# Patient Record
Sex: FEMALE | Race: WHITE | Employment: FULL TIME | URBAN - METROPOLITAN AREA
[De-identification: names, ages, dates, MRNs, and addresses within clinical notes are randomized per-mention and may not be internally consistent; named-entity substitution may affect disease eponyms.]

---

## 2017-12-02 ENCOUNTER — APPOINTMENT (OUTPATIENT)
Dept: GENERAL RADIOLOGY | Facility: HOSPITAL | Age: 57
End: 2017-12-02
Attending: EMERGENCY MEDICINE
Payer: COMMERCIAL

## 2017-12-02 ENCOUNTER — HOSPITAL ENCOUNTER (EMERGENCY)
Facility: HOSPITAL | Age: 57
Discharge: HOME | End: 2017-12-02
Attending: EMERGENCY MEDICINE
Payer: COMMERCIAL

## 2017-12-02 VITALS
TEMPERATURE: 100 F | HEART RATE: 99 BPM | SYSTOLIC BLOOD PRESSURE: 160 MMHG | HEIGHT: 64 IN | RESPIRATION RATE: 19 BRPM | DIASTOLIC BLOOD PRESSURE: 93 MMHG | WEIGHT: 158 LBS | BODY MASS INDEX: 26.98 KG/M2 | OXYGEN SATURATION: 100 %

## 2017-12-02 DIAGNOSIS — S82.131A CLOSED FRACTURE OF MEDIAL PORTION OF RIGHT TIBIAL PLATEAU, INITIAL ENCOUNTER: Primary | ICD-10-CM

## 2017-12-02 PROCEDURE — 99284 EMERGENCY DEPT VISIT MOD MDM: CPT

## 2017-12-02 PROCEDURE — 73562 X-RAY EXAM OF KNEE 3: CPT | Performed by: EMERGENCY MEDICINE

## 2017-12-02 PROCEDURE — 96372 THER/PROPH/DIAG INJ SC/IM: CPT

## 2017-12-02 PROCEDURE — 73552 X-RAY EXAM OF FEMUR 2/>: CPT | Performed by: EMERGENCY MEDICINE

## 2017-12-02 RX ORDER — HYDROCODONE BITARTRATE AND ACETAMINOPHEN 5; 325 MG/1; MG/1
1-2 TABLET ORAL EVERY 6 HOURS PRN
Qty: 24 TABLET | Refills: 0 | Status: SHIPPED | OUTPATIENT
Start: 2017-12-02 | End: 2018-10-03 | Stop reason: ALTCHOICE

## 2017-12-02 RX ORDER — KETOROLAC TROMETHAMINE 30 MG/ML
60 INJECTION, SOLUTION INTRAMUSCULAR; INTRAVENOUS ONCE
Status: COMPLETED | OUTPATIENT
Start: 2017-12-02 | End: 2017-12-02

## 2017-12-02 NOTE — ED PROVIDER NOTES
Patient Seen in: [facility] Emergency Department    History   Patient presents with:  Lower Extremity Injury (musculoskeletal)    Stated Complaint: right knee pain s/p mechanical fall    HPI    Patient is a 78-year-old female who presents emergency Well-developed, well-nourished female sitting up breathing easily in no apparent distress. Patient is nontoxic in appearance. HEENT: Head is normocephalic, atraumatic. Pupils are 3 mm equally round and reactive to light. Oropharynx is clear.  Mucous membr PULSES IN THE RIGHT FOOT, AND NO PAIN WITH PROM OF THE RIGHT ANKLE.   PT IS SITTING UP IN A CHAIR AND APPEARS MORE COMFORTABLE AT THIS TIME.  PT'S CASE DISCUSSED WITH DR Brielle Lewis WHO FEELS THAT THE PATIENT CAN GO HOME AND WILL FOLLOW UP WITH PT IN THE OFFICE been instructed to return to the ER immediately if symptoms worsen or if any other problems arise. Patient was discharged home with no further new complaints. Disposition and Plan     Clinical Impression:  No diagnosis found.     Disposition:

## 2017-12-13 ENCOUNTER — MED REC SCAN ONLY (OUTPATIENT)
Dept: FAMILY MEDICINE CLINIC | Facility: CLINIC | Age: 57
End: 2017-12-13

## 2018-09-19 DIAGNOSIS — Z12.31 ENCOUNTER FOR SCREENING MAMMOGRAM FOR MALIGNANT NEOPLASM OF BREAST: Primary | ICD-10-CM

## 2018-10-03 ENCOUNTER — HOSPITAL ENCOUNTER (OUTPATIENT)
Dept: GENERAL RADIOLOGY | Age: 58
Discharge: HOME | End: 2018-10-03
Attending: PHYSICIAN ASSISTANT
Payer: COMMERCIAL

## 2018-10-03 ENCOUNTER — LAB ENCOUNTER (OUTPATIENT)
Dept: LAB | Age: 58
End: 2018-10-03
Attending: PHYSICIAN ASSISTANT
Payer: COMMERCIAL

## 2018-10-03 ENCOUNTER — OFFICE VISIT (OUTPATIENT)
Dept: FAMILY MEDICINE CLINIC | Facility: CLINIC | Age: 58
End: 2018-10-03
Payer: COMMERCIAL

## 2018-10-03 VITALS
DIASTOLIC BLOOD PRESSURE: 80 MMHG | SYSTOLIC BLOOD PRESSURE: 146 MMHG | WEIGHT: 164 LBS | BODY MASS INDEX: 28 KG/M2 | TEMPERATURE: 97 F | HEART RATE: 72 BPM | HEIGHT: 64 IN | RESPIRATION RATE: 16 BRPM

## 2018-10-03 DIAGNOSIS — Z23 NEED FOR VACCINATION: ICD-10-CM

## 2018-10-03 DIAGNOSIS — M25.561 ACUTE PAIN OF RIGHT KNEE: Primary | ICD-10-CM

## 2018-10-03 DIAGNOSIS — M25.561 ACUTE PAIN OF RIGHT KNEE: ICD-10-CM

## 2018-10-03 DIAGNOSIS — M71.21 BAKER'S CYST OF KNEE, RIGHT: ICD-10-CM

## 2018-10-03 DIAGNOSIS — R22.0 MASS OF SCALP: ICD-10-CM

## 2018-10-03 DIAGNOSIS — Z87.81 HISTORY OF TIBIAL FRACTURE: ICD-10-CM

## 2018-10-03 DIAGNOSIS — Z00.00 ROUTINE MEDICAL EXAM: ICD-10-CM

## 2018-10-03 DIAGNOSIS — R03.0 ELEVATED BLOOD PRESSURE READING: ICD-10-CM

## 2018-10-03 DIAGNOSIS — Z12.11 SCREENING FOR COLON CANCER: ICD-10-CM

## 2018-10-03 PROCEDURE — 99203 OFFICE O/P NEW LOW 30 MIN: CPT | Performed by: PHYSICIAN ASSISTANT

## 2018-10-03 PROCEDURE — 90471 IMMUNIZATION ADMIN: CPT | Performed by: PHYSICIAN ASSISTANT

## 2018-10-03 PROCEDURE — 82306 VITAMIN D 25 HYDROXY: CPT | Performed by: PHYSICIAN ASSISTANT

## 2018-10-03 PROCEDURE — 83735 ASSAY OF MAGNESIUM: CPT | Performed by: PHYSICIAN ASSISTANT

## 2018-10-03 PROCEDURE — 90686 IIV4 VACC NO PRSV 0.5 ML IM: CPT | Performed by: PHYSICIAN ASSISTANT

## 2018-10-03 PROCEDURE — 84439 ASSAY OF FREE THYROXINE: CPT | Performed by: PHYSICIAN ASSISTANT

## 2018-10-03 PROCEDURE — 36415 COLL VENOUS BLD VENIPUNCTURE: CPT | Performed by: PHYSICIAN ASSISTANT

## 2018-10-03 PROCEDURE — 80061 LIPID PANEL: CPT | Performed by: PHYSICIAN ASSISTANT

## 2018-10-03 PROCEDURE — 73560 X-RAY EXAM OF KNEE 1 OR 2: CPT | Performed by: PHYSICIAN ASSISTANT

## 2018-10-03 PROCEDURE — 80050 GENERAL HEALTH PANEL: CPT | Performed by: PHYSICIAN ASSISTANT

## 2018-10-03 PROCEDURE — 82607 VITAMIN B-12: CPT | Performed by: PHYSICIAN ASSISTANT

## 2018-10-03 NOTE — PROGRESS NOTES
HPI:   Duong Fuentes is a 59year old female who presents for knee pain. Was seen at immediate care in [place] but she declined an xray or any workup. She is a new/former patient to the practice.     10 months ago she fractured her right tibal plateau Comment: 2 times month    Drug use: No       REVIEW OF SYSTEMS:   GENERAL: feels well otherwise  SKIN: lump on scalp for 5 years  HEENT:no vision or hearing changes; denies nasal congestion, sinus pain or sore throat  LUNGS: denies shortness of breath, kapil VIEWS), RIGHT (CPT=73560); Future  - ORTHOPEDIC - INTERNAL    4. Baker's cyst of knee, right  Xray today  Ortho consult  - XR KNEE (1 OR 2 VIEWS), RIGHT (CPT=73560); Future  - ORTHOPEDIC - INTERNAL    5.  Routine medical exam  Fasting labs ordered  Schedule

## 2018-10-08 ENCOUNTER — PATIENT MESSAGE (OUTPATIENT)
Dept: FAMILY MEDICINE CLINIC | Facility: CLINIC | Age: 58
End: 2018-10-08

## 2018-10-08 NOTE — TELEPHONE ENCOUNTER
Patient does not have to see Dr Lacy Bernal. She can see another provider in the practice, Dr Corazon Colvin, Dr Erlinda Rider. She could also try Dr Case Johns with [facility].

## 2018-10-08 NOTE — TELEPHONE ENCOUNTER
From: Catarina Hernandez  To: Dana Leal  Sent: 10/8/2018 10:35 AM CDT  Subject: Referral Request    Tal Pérez,    Thanks for seeing me on October 3rd. I was hoping you could give me a 1 or 2 new referrals.  My knee is still painful and swolle

## 2018-10-26 NOTE — PROGRESS NOTES
HPI:   Magdi Mendoza is a 59year old female who presents for a complete physical exam. Symptoms: denies discharge, itching, burning or dysuria, is menopausal. Patient complains of needing physical.    She had labs done earlier this month.  TSH was Triglycerides (mg/dL)   Date Value   01/26/2016 116     AST (U/L)   Date Value   10/03/2018 19   01/26/2016 28   06/30/2012 13 (L)     ALT (U/L)   Date Value   01/26/2016 74 (H)   06/30/2012 29     Alt (U/L)   Date Value   10/03/2018 34         last pa denies hx of anemia  ENDOCRINE: denies thyroid history  ALL/ASTHMA: denies hx of allergy or asthma    EXAM:   /80   Pulse 79   Temp 98.3 °F (36.8 °C) (Oral)   Resp 20   Ht 64\"   Wt 164 lb 12.8 oz   SpO2 99%   BMI 28.29 kg/m²   Body mass index is 28. reading  Low salt, heart healthy diet, regular aerobic exercise  Recommend DASH diet  Nurse visit in 1 month for BP recheck    6. Hyperlipidemia  Decrease starches and carbs in diet  Discussed fenofibrate; pt declines  Fish oil 2000 mg OTC daily    7.  Subc

## 2018-10-29 ENCOUNTER — OFFICE VISIT (OUTPATIENT)
Dept: FAMILY MEDICINE CLINIC | Facility: CLINIC | Age: 58
End: 2018-10-29
Payer: COMMERCIAL

## 2018-10-29 VITALS
WEIGHT: 164.81 LBS | DIASTOLIC BLOOD PRESSURE: 80 MMHG | HEART RATE: 79 BPM | RESPIRATION RATE: 20 BRPM | TEMPERATURE: 98 F | HEIGHT: 64 IN | SYSTOLIC BLOOD PRESSURE: 138 MMHG | OXYGEN SATURATION: 99 % | BODY MASS INDEX: 28.14 KG/M2

## 2018-10-29 DIAGNOSIS — R22.1 LOCALIZED SWELLING, MASS AND LUMP, NECK: ICD-10-CM

## 2018-10-29 DIAGNOSIS — E78.2 MIXED HYPERLIPIDEMIA: ICD-10-CM

## 2018-10-29 DIAGNOSIS — Z78.0 ASYMPTOMATIC MENOPAUSE: ICD-10-CM

## 2018-10-29 DIAGNOSIS — R03.0 ELEVATED BLOOD PRESSURE READING: ICD-10-CM

## 2018-10-29 DIAGNOSIS — L98.9 SKIN LESION OF RIGHT LEG: ICD-10-CM

## 2018-10-29 DIAGNOSIS — Z00.00 WELL WOMAN EXAM (NO GYNECOLOGICAL EXAM): Primary | ICD-10-CM

## 2018-10-29 DIAGNOSIS — E03.8 SUBCLINICAL HYPOTHYROIDISM: ICD-10-CM

## 2018-10-29 PROCEDURE — 99214 OFFICE O/P EST MOD 30 MIN: CPT | Performed by: PHYSICIAN ASSISTANT

## 2018-10-29 PROCEDURE — 99396 PREV VISIT EST AGE 40-64: CPT | Performed by: PHYSICIAN ASSISTANT

## 2018-12-01 ENCOUNTER — HOSPITAL ENCOUNTER (OUTPATIENT)
Dept: MAMMOGRAPHY | Age: 58
Discharge: HOME | End: 2018-12-01
Attending: INTERNAL MEDICINE
Payer: COMMERCIAL

## 2018-12-01 ENCOUNTER — HOSPITAL ENCOUNTER (OUTPATIENT)
Dept: BONE DENSITY | Age: 58
Discharge: HOME | End: 2018-12-01
Attending: PHYSICIAN ASSISTANT
Payer: COMMERCIAL

## 2018-12-01 ENCOUNTER — HOSPITAL ENCOUNTER (OUTPATIENT)
Dept: ULTRASOUND IMAGING | Age: 58
Discharge: HOME | End: 2018-12-01
Attending: PHYSICIAN ASSISTANT
Payer: COMMERCIAL

## 2018-12-01 DIAGNOSIS — Z78.0 ASYMPTOMATIC MENOPAUSE: ICD-10-CM

## 2018-12-01 DIAGNOSIS — Z12.31 ENCOUNTER FOR SCREENING MAMMOGRAM FOR MALIGNANT NEOPLASM OF BREAST: ICD-10-CM

## 2018-12-01 DIAGNOSIS — R22.1 LOCALIZED SWELLING, MASS AND LUMP, NECK: ICD-10-CM

## 2018-12-01 PROCEDURE — 77080 DXA BONE DENSITY AXIAL: CPT | Performed by: PHYSICIAN ASSISTANT

## 2018-12-01 PROCEDURE — 77067 SCR MAMMO BI INCL CAD: CPT | Performed by: INTERNAL MEDICINE

## 2018-12-01 PROCEDURE — 76536 US EXAM OF HEAD AND NECK: CPT | Performed by: PHYSICIAN ASSISTANT

## 2018-12-01 PROCEDURE — 77063 BREAST TOMOSYNTHESIS BI: CPT | Performed by: INTERNAL MEDICINE

## 2018-12-17 ENCOUNTER — HOSPITAL ENCOUNTER (OUTPATIENT)
Dept: ULTRASOUND IMAGING | Facility: HOSPITAL | Age: 58
Discharge: HOME | End: 2018-12-17
Attending: OTOLARYNGOLOGY
Payer: COMMERCIAL

## 2018-12-17 DIAGNOSIS — E04.1 NONTOXIC UNINODULAR GOITER: ICD-10-CM

## 2018-12-17 PROCEDURE — 76942 ECHO GUIDE FOR BIOPSY: CPT | Performed by: OTOLARYNGOLOGY

## 2018-12-17 PROCEDURE — 10022 US FNA THYROID (CPT=10022/76942): CPT | Performed by: OTOLARYNGOLOGY

## 2018-12-17 PROCEDURE — 88173 CYTOPATH EVAL FNA REPORT: CPT | Performed by: OTOLARYNGOLOGY

## 2020-02-17 ENCOUNTER — OFFICE VISIT (OUTPATIENT)
Dept: FAMILY MEDICINE CLINIC | Facility: CLINIC | Age: 60
End: 2020-02-17
Payer: COMMERCIAL

## 2020-02-17 VITALS
SYSTOLIC BLOOD PRESSURE: 132 MMHG | DIASTOLIC BLOOD PRESSURE: 86 MMHG | TEMPERATURE: 98 F | HEART RATE: 87 BPM | HEIGHT: 64 IN | WEIGHT: 168 LBS | RESPIRATION RATE: 16 BRPM | BODY MASS INDEX: 28.68 KG/M2 | OXYGEN SATURATION: 97 %

## 2020-02-17 DIAGNOSIS — R05.9 COUGH: ICD-10-CM

## 2020-02-17 DIAGNOSIS — B02.9 HERPES ZOSTER WITHOUT COMPLICATION: Primary | ICD-10-CM

## 2020-02-17 PROCEDURE — 99214 OFFICE O/P EST MOD 30 MIN: CPT | Performed by: PHYSICIAN ASSISTANT

## 2020-02-17 RX ORDER — VALACYCLOVIR HYDROCHLORIDE 1 G/1
1 TABLET, FILM COATED ORAL 3 TIMES DAILY
Qty: 21 TABLET | Refills: 0 | Status: SHIPPED | OUTPATIENT
Start: 2020-02-17 | End: 2020-02-24

## 2020-02-17 NOTE — PROGRESS NOTES
Osvaldo Cardoza is a 62year old female. Patient presents with:  Shingles      HPI:   Patient presents today c/o rash on her lower back.  About 2 weeks ago started feeling some soreness in the left low back that would radiate to the left lower abdom Pulse 87   Temp 97.7 °F (36.5 °C) (Oral)   Resp 16   Ht 64\"   Wt 168 lb (76.2 kg)   SpO2 97%   BMI 28.84 kg/m²   GENERAL: well developed, well nourished, NAD. HEENT: AT/NC. PERRLA. EACs normal. Bilateral middle ear effusion, right worse than left.  Post

## 2020-09-30 ENCOUNTER — TELEPHONE (OUTPATIENT)
Dept: FAMILY MEDICINE CLINIC | Facility: CLINIC | Age: 60
End: 2020-09-30

## 2020-09-30 NOTE — TELEPHONE ENCOUNTER
Pt scheduled for px 10/19. Originally through [employer]. It was rescheduled. She says she has a dry cough. She has been tested for covid 3 times due to work. Just want to make sure she is ok to come in for a px.   Told her she would get a call tomorrow

## 2020-10-01 NOTE — TELEPHONE ENCOUNTER
Spoke with patient:  Reports this is not a new cough, chronic, more than few months. Reports she works at [employer]- in [place]. No current smoking, history of casual smoking. No weight loss, has gained weight. No snoring at night.

## 2020-10-17 ENCOUNTER — HOSPITAL ENCOUNTER (OUTPATIENT)
Dept: MAMMOGRAPHY | Facility: HOSPITAL | Age: 60
Discharge: HOME | End: 2020-10-17
Attending: OBSTETRICS & GYNECOLOGY
Payer: COMMERCIAL

## 2020-10-17 DIAGNOSIS — Z12.31 ENCOUNTER FOR SCREENING MAMMOGRAM FOR BREAST CANCER: ICD-10-CM

## 2020-10-17 PROCEDURE — 77063 BREAST TOMOSYNTHESIS BI: CPT | Performed by: OBSTETRICS & GYNECOLOGY

## 2020-10-17 PROCEDURE — 77067 SCR MAMMO BI INCL CAD: CPT | Performed by: OBSTETRICS & GYNECOLOGY

## 2020-10-19 ENCOUNTER — OFFICE VISIT (OUTPATIENT)
Dept: FAMILY MEDICINE CLINIC | Facility: CLINIC | Age: 60
End: 2020-10-19
Payer: COMMERCIAL

## 2020-10-19 ENCOUNTER — LAB ENCOUNTER (OUTPATIENT)
Dept: LAB | Age: 60
End: 2020-10-19
Attending: PHYSICIAN ASSISTANT
Payer: COMMERCIAL

## 2020-10-19 VITALS
OXYGEN SATURATION: 98 % | HEART RATE: 85 BPM | DIASTOLIC BLOOD PRESSURE: 86 MMHG | BODY MASS INDEX: 29.96 KG/M2 | WEIGHT: 167 LBS | TEMPERATURE: 97 F | RESPIRATION RATE: 16 BRPM | SYSTOLIC BLOOD PRESSURE: 132 MMHG | HEIGHT: 62.5 IN

## 2020-10-19 DIAGNOSIS — Z23 NEED FOR VACCINATION: ICD-10-CM

## 2020-10-19 DIAGNOSIS — E55.9 VITAMIN D DEFICIENCY: ICD-10-CM

## 2020-10-19 DIAGNOSIS — E04.9 GOITER: ICD-10-CM

## 2020-10-19 DIAGNOSIS — Z12.11 COLON CANCER SCREENING: ICD-10-CM

## 2020-10-19 DIAGNOSIS — R05.3 CHRONIC COUGH: ICD-10-CM

## 2020-10-19 DIAGNOSIS — Z00.00 ROUTINE GENERAL MEDICAL EXAMINATION AT A HEALTH CARE FACILITY: ICD-10-CM

## 2020-10-19 DIAGNOSIS — Z00.00 ROUTINE GENERAL MEDICAL EXAMINATION AT A HEALTH CARE FACILITY: Primary | ICD-10-CM

## 2020-10-19 PROCEDURE — 90686 IIV4 VACC NO PRSV 0.5 ML IM: CPT | Performed by: FAMILY MEDICINE

## 2020-10-19 PROCEDURE — 3075F SYST BP GE 130 - 139MM HG: CPT | Performed by: FAMILY MEDICINE

## 2020-10-19 PROCEDURE — 80061 LIPID PANEL: CPT | Performed by: PHYSICIAN ASSISTANT

## 2020-10-19 PROCEDURE — 80050 GENERAL HEALTH PANEL: CPT | Performed by: PHYSICIAN ASSISTANT

## 2020-10-19 PROCEDURE — 82306 VITAMIN D 25 HYDROXY: CPT | Performed by: PHYSICIAN ASSISTANT

## 2020-10-19 PROCEDURE — 3079F DIAST BP 80-89 MM HG: CPT | Performed by: FAMILY MEDICINE

## 2020-10-19 PROCEDURE — 90471 IMMUNIZATION ADMIN: CPT | Performed by: FAMILY MEDICINE

## 2020-10-19 PROCEDURE — 82607 VITAMIN B-12: CPT | Performed by: PHYSICIAN ASSISTANT

## 2020-10-19 PROCEDURE — 84439 ASSAY OF FREE THYROXINE: CPT | Performed by: PHYSICIAN ASSISTANT

## 2020-10-19 PROCEDURE — 36415 COLL VENOUS BLD VENIPUNCTURE: CPT | Performed by: PHYSICIAN ASSISTANT

## 2020-10-19 PROCEDURE — 3008F BODY MASS INDEX DOCD: CPT | Performed by: FAMILY MEDICINE

## 2020-10-19 PROCEDURE — 99396 PREV VISIT EST AGE 40-64: CPT | Performed by: PHYSICIAN ASSISTANT

## 2020-10-19 RX ORDER — BENZONATATE 100 MG/1
100 CAPSULE ORAL 3 TIMES DAILY PRN
Qty: 30 CAPSULE | Refills: 0 | Status: SHIPPED | OUTPATIENT
Start: 2020-10-19 | End: 2020-11-19

## 2020-10-19 NOTE — PROGRESS NOTES
HPI:    Patient ID: Toñito Fallon is a 62year old female. HPI   Patient presents today requesting physical exam. Overall feeling okay. Has concerns today about chronic cough over the last 2 years.   It is dry and she denies any associated breat dysuria, frequency and hematuria. Musculoskeletal: Negative for myalgias, joint pain and gait problem. Skin: Negative for rash. Neurological: Negative for weakness, light-headedness and headaches. Hematological: Negative for adenopathy.    Psychiatr Health maintenance issues discussed. Encouraged routine vaccines. Advised healthy diet and regular exercise. Annual physicals. - CBC WITH DIFFERENTIAL WITH PLATELET; Future  - COMP METABOLIC PANEL (14); Future  - LIPID PANEL;  Future  - TSH W REFLEX TO F FREE 0.5 ML  US THYROID (CPT=76536)  XR CHEST PA + LAT CHEST (CPT=71046)         OS#[id number]

## 2021-03-27 ENCOUNTER — HOSPITAL ENCOUNTER (OUTPATIENT)
Age: 61
Discharge: HOME | End: 2021-03-27
Payer: COMMERCIAL

## 2021-03-27 ENCOUNTER — APPOINTMENT (OUTPATIENT)
Dept: GENERAL RADIOLOGY | Age: 61
End: 2021-03-27
Attending: NURSE PRACTITIONER
Payer: COMMERCIAL

## 2021-03-27 VITALS
WEIGHT: 159 LBS | BODY MASS INDEX: 28.17 KG/M2 | SYSTOLIC BLOOD PRESSURE: 143 MMHG | OXYGEN SATURATION: 96 % | TEMPERATURE: 100 F | HEIGHT: 63 IN | RESPIRATION RATE: 16 BRPM | HEART RATE: 87 BPM | DIASTOLIC BLOOD PRESSURE: 86 MMHG

## 2021-03-27 DIAGNOSIS — Z20.822 LAB TEST NEGATIVE FOR COVID-19 VIRUS: Primary | ICD-10-CM

## 2021-03-27 DIAGNOSIS — J06.9 UPPER RESPIRATORY TRACT INFECTION, UNSPECIFIED TYPE: ICD-10-CM

## 2021-03-27 LAB — SARS-COV-2 RNA RESP QL NAA+PROBE: NOT DETECTED

## 2021-03-27 PROCEDURE — 71046 X-RAY EXAM CHEST 2 VIEWS: CPT | Performed by: NURSE PRACTITIONER

## 2021-03-27 PROCEDURE — 99213 OFFICE O/P EST LOW 20 MIN: CPT

## 2021-03-27 PROCEDURE — 94664 DEMO&/EVAL PT USE INHALER: CPT

## 2021-03-27 PROCEDURE — 99204 OFFICE O/P NEW MOD 45 MIN: CPT

## 2021-03-27 RX ORDER — ALBUTEROL SULFATE 90 UG/1
2 AEROSOL, METERED RESPIRATORY (INHALATION) EVERY 4 HOURS PRN
Qty: 1 INHALER | Refills: 0 | Status: SHIPPED | OUTPATIENT
Start: 2021-03-27 | End: 2021-04-26

## 2021-03-27 RX ORDER — PREDNISONE 20 MG/1
20 TABLET ORAL DAILY
Qty: 5 TABLET | Refills: 0 | Status: SHIPPED | OUTPATIENT
Start: 2021-03-27 | End: 2021-04-01

## 2021-03-27 NOTE — ED PROVIDER NOTES
Patient Seen in: [facility]      History   Patient presents with:  Cough/URI    Stated Complaint: FEVER, SORE THROAT, CONGESTION, HA, COUGH X 1 WEEK    HPI/Subjective:   HPI  Patient is a 23-year-old female past medical history of cervici except as noted above.     Physical Exam     ED Triage Vitals [03/27/21 1020]   /86   Pulse 87   Resp 16   Temp 99.7 °F (37.6 °C)   Temp src Tympanic   SpO2 96 %   O2 Device None (Room air)       Current:/86   Pulse 87   Temp 99.7 °F (37.6 °C) ( Lymphadenopathy:      Cervical: No cervical adenopathy. Skin:     General: Skin is warm and dry. Capillary Refill: Capillary refill takes less than 2 seconds. Findings: No rash.    Neurological:      Mental Status: She is alert and oriented to injective conjunctiva bilaterally. Denies headaches or foreign body sensation or light sensitivity. Prescription for tobramycin ophthalmic drops sent to patient's pharmacy on record.   I advised patient to follow-up if her symptoms do not resolve within a

## 2021-03-27 NOTE — ED INITIAL ASSESSMENT (HPI)
Since Monday, c/o headache, sinus congestion, sore throat, low grade temp/chills, and productive cough with yellow sputum. Using OTC meds with minimal relief. Covid test on Wednesday at [place] and was negative.  Had 1st covid vaccine on 3/2/21 and schedu

## 2021-03-28 RX ORDER — TOBRAMYCIN 3 MG/ML
2 SOLUTION/ DROPS OPHTHALMIC EVERY 6 HOURS
Qty: 1 BOTTLE | Refills: 0 | Status: SHIPPED | OUTPATIENT
Start: 2021-03-28 | End: 2021-04-04

## 2021-03-28 NOTE — ED NOTES
Patient called back to clinic stating she awoke with a goopy eye. Wondering if she needs to be seen again. D/w Gavino Doshi and she will call patient.

## 2021-03-30 ENCOUNTER — HOSPITAL ENCOUNTER (OUTPATIENT)
Age: 61
Discharge: HOME | End: 2021-03-30
Payer: COMMERCIAL

## 2021-03-30 ENCOUNTER — APPOINTMENT (OUTPATIENT)
Dept: GENERAL RADIOLOGY | Age: 61
End: 2021-03-30
Attending: NURSE PRACTITIONER
Payer: COMMERCIAL

## 2021-03-30 VITALS
WEIGHT: 159 LBS | BODY MASS INDEX: 27.14 KG/M2 | TEMPERATURE: 100 F | DIASTOLIC BLOOD PRESSURE: 97 MMHG | HEART RATE: 78 BPM | OXYGEN SATURATION: 97 % | HEIGHT: 64 IN | RESPIRATION RATE: 18 BRPM | SYSTOLIC BLOOD PRESSURE: 165 MMHG

## 2021-03-30 DIAGNOSIS — J40 BRONCHITIS: Primary | ICD-10-CM

## 2021-03-30 PROCEDURE — 99204 OFFICE O/P NEW MOD 45 MIN: CPT | Performed by: NURSE PRACTITIONER

## 2021-03-30 PROCEDURE — 99213 OFFICE O/P EST LOW 20 MIN: CPT | Performed by: NURSE PRACTITIONER

## 2021-03-30 PROCEDURE — 71046 X-RAY EXAM CHEST 2 VIEWS: CPT | Performed by: NURSE PRACTITIONER

## 2021-03-30 RX ORDER — BENZONATATE 100 MG/1
200 CAPSULE ORAL 3 TIMES DAILY PRN
Qty: 30 CAPSULE | Refills: 0 | Status: SHIPPED | OUTPATIENT
Start: 2021-03-30 | End: 2021-04-29

## 2021-03-30 NOTE — ED INITIAL ASSESSMENT (HPI)
C/o cough for a week, sore throat and both ear pain left worse than right. A lot of post nasal drip. Was seen here 3/27/21 states cough is worse. Currently on Prednisone.

## 2021-03-30 NOTE — ED PROVIDER NOTES
Patient Seen in: [facility]      History   Patient presents with:  Cough  Sore Throat    Stated Complaint: COUGH     HPI/Subjective:   HPI    Patient presents to the urgent care with report of having been seen here on Saturday being diagn injury  Neuro: Denies syncope, loss of balance, weakness  Integumentary: Denies rashes, bruising petechiae                  Positive for stated complaint: COUGH   Other systems are as noted in HPI. Constitutional and vital signs reviewed.       All other s 11:13 AM.  Michael Isidro, XR, CHEST PA   LATERAL, 6/30/2012, 12:36 PM.  TECHNIQUE:  PA and lateral chest radiographs were obtained. PATIENT STATED HISTORY: (As transcribed by Technologist)  Patient complains of a cough for a week.     FINDINGS:  LUNGS:  No f Comprehensive verbal and written discharge and follow-up instructions were provided to help prevent relapse or worsening. I discussed the case with the patient and they had no questions, complaints, or concerns.   Patient states they understand diagnosis,

## 2021-12-22 ENCOUNTER — HOSPITAL ENCOUNTER (OUTPATIENT)
Age: 61
Discharge: HOME | End: 2021-12-22
Payer: COMMERCIAL

## 2021-12-22 VITALS
TEMPERATURE: 98 F | WEIGHT: 159 LBS | DIASTOLIC BLOOD PRESSURE: 79 MMHG | SYSTOLIC BLOOD PRESSURE: 139 MMHG | HEIGHT: 64 IN | RESPIRATION RATE: 20 BRPM | HEART RATE: 52 BPM | BODY MASS INDEX: 27.14 KG/M2 | OXYGEN SATURATION: 96 %

## 2021-12-22 DIAGNOSIS — R05.3 PERSISTENT COUGH: Primary | ICD-10-CM

## 2021-12-22 DIAGNOSIS — J98.01 BRONCHOSPASM: ICD-10-CM

## 2021-12-22 PROCEDURE — 99213 OFFICE O/P EST LOW 20 MIN: CPT

## 2021-12-22 RX ORDER — ALBUTEROL SULFATE 90 UG/1
2 AEROSOL, METERED RESPIRATORY (INHALATION) EVERY 4 HOURS PRN
Qty: 1 EACH | Refills: 0 | Status: SHIPPED | OUTPATIENT
Start: 2021-12-22 | End: 2022-01-21

## 2021-12-22 RX ORDER — CODEINE PHOSPHATE AND GUAIFENESIN 10; 100 MG/5ML; MG/5ML
10 SOLUTION ORAL NIGHTLY PRN
Qty: 200 ML | Refills: 0 | Status: SHIPPED | OUTPATIENT
Start: 2021-12-22

## 2021-12-22 RX ORDER — DEXAMETHASONE 4 MG/1
16 TABLET ORAL ONCE
Status: COMPLETED | OUTPATIENT
Start: 2021-12-22 | End: 2021-12-22

## 2021-12-22 RX ORDER — CETIRIZINE HYDROCHLORIDE 10 MG/1
10 TABLET ORAL DAILY
Qty: 30 TABLET | Refills: 0 | Status: SHIPPED | OUTPATIENT
Start: 2021-12-22 | End: 2022-01-21

## 2021-12-22 RX ORDER — AZITHROMYCIN 250 MG/1
TABLET, FILM COATED ORAL
Qty: 6 TABLET | Refills: 0 | Status: SHIPPED | OUTPATIENT
Start: 2021-12-22 | End: 2021-12-27

## 2021-12-23 NOTE — ED PROVIDER NOTES
Patient Seen in: [facility]      History   Patient presents with:  Cough    Stated Complaint: Cough/Congestion 3 days    Subjective:   HPI    76-year-old female who tested negative for Covid here with complaint of a persistent wheezy coug Normocephalic.       Right Ear: External ear normal.      Left Ear: External ear normal.      Nose: Nose normal.      Mouth/Throat:      Mouth: Mucous membranes are moist.   Eyes:      Conjunctiva/sclera: Conjunctivae normal.      Pupils: Pupils are equal, discharge today. Previous conversations with PCP and charts were reviewed.                                 Disposition and Plan     Clinical Impression:  Persistent cough  (primary encounter diagnosis)  Bronchospasm     Disposition:  Discharge  12/22/2021

## 2021-12-23 NOTE — ED INITIAL ASSESSMENT (HPI)
C/o started Snday with sore throat and ears popping. Monday started with cough and congestion/nasal discharges. Covid test PCR swabbed on Monday with negative result today.

## 2023-01-26 ENCOUNTER — HOSPITAL ENCOUNTER (OUTPATIENT)
Age: 63
Discharge: HOME | End: 2023-01-26
Payer: COMMERCIAL

## 2023-01-26 VITALS
BODY MASS INDEX: 27.31 KG/M2 | HEART RATE: 73 BPM | DIASTOLIC BLOOD PRESSURE: 92 MMHG | RESPIRATION RATE: 16 BRPM | WEIGHT: 160 LBS | HEIGHT: 64 IN | TEMPERATURE: 98 F | SYSTOLIC BLOOD PRESSURE: 162 MMHG | OXYGEN SATURATION: 97 %

## 2023-01-26 DIAGNOSIS — J04.0 ACUTE LARYNGITIS: Primary | ICD-10-CM

## 2023-01-26 DIAGNOSIS — J20.8 ACUTE VIRAL BRONCHITIS: ICD-10-CM

## 2023-01-26 DIAGNOSIS — R03.0 ELEVATED BLOOD PRESSURE READING: ICD-10-CM

## 2023-01-26 LAB
S PYO AG THROAT QL: NEGATIVE
SARS-COV-2 RNA RESP QL NAA+PROBE: NOT DETECTED

## 2023-01-26 PROCEDURE — 99213 OFFICE O/P EST LOW 20 MIN: CPT

## 2023-01-26 PROCEDURE — 87880 STREP A ASSAY W/OPTIC: CPT

## 2023-01-26 RX ORDER — PREDNISONE 20 MG/1
40 TABLET ORAL DAILY
Qty: 10 TABLET | Refills: 0 | Status: SHIPPED | OUTPATIENT
Start: 2023-01-26 | End: 2023-01-31

## 2023-01-26 RX ORDER — BENZONATATE 100 MG/1
100 CAPSULE ORAL 3 TIMES DAILY PRN
Qty: 30 CAPSULE | Refills: 0 | Status: SHIPPED | OUTPATIENT
Start: 2023-01-26 | End: 2023-02-25

## 2023-01-26 NOTE — ED INITIAL ASSESSMENT (HPI)
C/o cough, chest congestion, headache and post nasal drip since Friday with sore throat worse at night. Loss of voice on Friday getting better but voice hoarse. No fever. Home kit Covid test negative this morning.

## 2023-01-26 NOTE — DISCHARGE INSTRUCTIONS
Please follow up with your PCP if no improvement within 5-7 days. Go directly to the ER for any acute worsening of symptoms. If you smoke, stop smoking. Push oral fluids to help loosen up chest mucous and move it out of your body. Use a cold mist humidifier. Get enough rest and sleep. Take Guaifenesin (Robitussin), an expectorant to loosen mucous. To suppress a dry, hacking cough, you may take Dextromethorphan (Robitussin DM)  If a bronchodilating inhaler is prescribed, follow instructions given. If antibiotics is prescribed, be sure to take all of the the medication even if you improve before finishing the medication. Seek immediate medical attention if symptoms worsen, if fever higher than 102, if no improvement in a few days. Symptoms usually last 10 days.

## 2023-02-20 ENCOUNTER — OFFICE VISIT (OUTPATIENT)
Dept: FAMILY MEDICINE CLINIC | Facility: CLINIC | Age: 63
End: 2023-02-20
Payer: COMMERCIAL

## 2023-02-20 ENCOUNTER — LAB ENCOUNTER (OUTPATIENT)
Dept: LAB | Age: 63
End: 2023-02-20
Attending: FAMILY MEDICINE
Payer: COMMERCIAL

## 2023-02-20 VITALS
HEART RATE: 78 BPM | DIASTOLIC BLOOD PRESSURE: 76 MMHG | BODY MASS INDEX: 27.31 KG/M2 | SYSTOLIC BLOOD PRESSURE: 128 MMHG | OXYGEN SATURATION: 99 % | HEIGHT: 64 IN | RESPIRATION RATE: 18 BRPM | TEMPERATURE: 98 F | WEIGHT: 160 LBS

## 2023-02-20 DIAGNOSIS — Z00.00 WELLNESS EXAMINATION: Primary | ICD-10-CM

## 2023-02-20 DIAGNOSIS — Z12.11 SCREENING FOR COLORECTAL CANCER: ICD-10-CM

## 2023-02-20 DIAGNOSIS — Z13.29 THYROID DISORDER SCREEN: ICD-10-CM

## 2023-02-20 DIAGNOSIS — E55.9 VITAMIN D DEFICIENCY: ICD-10-CM

## 2023-02-20 DIAGNOSIS — Z23 NEED FOR SHINGLES VACCINE: ICD-10-CM

## 2023-02-20 DIAGNOSIS — Z13.220 LIPID SCREENING: ICD-10-CM

## 2023-02-20 DIAGNOSIS — Z12.12 SCREENING FOR COLORECTAL CANCER: ICD-10-CM

## 2023-02-20 DIAGNOSIS — Z00.00 WELLNESS EXAMINATION: ICD-10-CM

## 2023-02-20 DIAGNOSIS — Z13.0 SCREENING FOR DEFICIENCY ANEMIA: ICD-10-CM

## 2023-02-20 DIAGNOSIS — E04.1 THYROID NODULE: ICD-10-CM

## 2023-02-20 LAB
ALBUMIN SERPL-MCNC: 3.9 G/DL (ref 3.4–5)
ALBUMIN/GLOB SERPL: 1.2 {RATIO} (ref 1–2)
ALP LIVER SERPL-CCNC: 60 U/L
ALT SERPL-CCNC: 38 U/L
ANION GAP SERPL CALC-SCNC: 5 MMOL/L (ref 0–18)
AST SERPL-CCNC: 25 U/L (ref 15–37)
BASOPHILS # BLD AUTO: 0.04 X10(3) UL (ref 0–0.2)
BASOPHILS NFR BLD AUTO: 0.9 %
BILIRUB SERPL-MCNC: 0.7 MG/DL (ref 0.1–2)
BUN BLD-MCNC: 16 MG/DL (ref 7–18)
CALCIUM BLD-MCNC: 8.9 MG/DL (ref 8.5–10.1)
CHLORIDE SERPL-SCNC: 108 MMOL/L (ref 98–112)
CHOLEST SERPL-MCNC: 234 MG/DL (ref ?–200)
CO2 SERPL-SCNC: 25 MMOL/L (ref 21–32)
CREAT BLD-MCNC: 0.75 MG/DL
EOSINOPHIL # BLD AUTO: 0.16 X10(3) UL (ref 0–0.7)
EOSINOPHIL NFR BLD AUTO: 3.4 %
ERYTHROCYTE [DISTWIDTH] IN BLOOD BY AUTOMATED COUNT: 12.6 %
FASTING PATIENT LIPID ANSWER: YES
FASTING STATUS PATIENT QL REPORTED: YES
GFR SERPLBLD BASED ON 1.73 SQ M-ARVRAT: 89 ML/MIN/1.73M2 (ref 60–?)
GLOBULIN PLAS-MCNC: 3.2 G/DL (ref 2.8–4.4)
GLUCOSE BLD-MCNC: 93 MG/DL (ref 70–99)
HCT VFR BLD AUTO: 43.1 %
HDLC SERPL-MCNC: 42 MG/DL (ref 40–59)
HGB BLD-MCNC: 13.9 G/DL
IMM GRANULOCYTES # BLD AUTO: 0.01 X10(3) UL (ref 0–1)
IMM GRANULOCYTES NFR BLD: 0.2 %
LDLC SERPL CALC-MCNC: 150 MG/DL (ref ?–100)
LYMPHOCYTES # BLD AUTO: 1.85 X10(3) UL (ref 1–4)
LYMPHOCYTES NFR BLD AUTO: 39.8 %
MCH RBC QN AUTO: 29.9 PG (ref 26–34)
MCHC RBC AUTO-ENTMCNC: 32.3 G/DL (ref 31–37)
MCV RBC AUTO: 92.7 FL
MONOCYTES # BLD AUTO: 0.34 X10(3) UL (ref 0.1–1)
MONOCYTES NFR BLD AUTO: 7.3 %
NEUTROPHILS # BLD AUTO: 2.25 X10 (3) UL (ref 1.5–7.7)
NEUTROPHILS # BLD AUTO: 2.25 X10(3) UL (ref 1.5–7.7)
NEUTROPHILS NFR BLD AUTO: 48.4 %
NONHDLC SERPL-MCNC: 192 MG/DL (ref ?–130)
OSMOLALITY SERPL CALC.SUM OF ELEC: 287 MOSM/KG (ref 275–295)
PLATELET # BLD AUTO: 286 10(3)UL (ref 150–450)
POTASSIUM SERPL-SCNC: 3.5 MMOL/L (ref 3.5–5.1)
PROT SERPL-MCNC: 7.1 G/DL (ref 6.4–8.2)
RBC # BLD AUTO: 4.65 X10(6)UL
SODIUM SERPL-SCNC: 138 MMOL/L (ref 136–145)
T4 FREE SERPL-MCNC: 1 NG/DL (ref 0.8–1.7)
TRIGL SERPL-MCNC: 229 MG/DL (ref 30–149)
TSI SER-ACNC: 4.62 MIU/ML (ref 0.36–3.74)
VIT D+METAB SERPL-MCNC: 16.2 NG/ML (ref 30–100)
VLDLC SERPL CALC-MCNC: 44 MG/DL (ref 0–30)
WBC # BLD AUTO: 4.7 X10(3) UL (ref 4–11)

## 2023-02-20 PROCEDURE — 82306 VITAMIN D 25 HYDROXY: CPT | Performed by: FAMILY MEDICINE

## 2023-02-20 PROCEDURE — 84439 ASSAY OF FREE THYROXINE: CPT | Performed by: FAMILY MEDICINE

## 2023-02-20 PROCEDURE — 80061 LIPID PANEL: CPT | Performed by: FAMILY MEDICINE

## 2023-02-20 PROCEDURE — 80050 GENERAL HEALTH PANEL: CPT | Performed by: FAMILY MEDICINE

## 2023-09-08 ENCOUNTER — HOSPITAL ENCOUNTER (OUTPATIENT)
Age: 63
Discharge: HOME | End: 2023-09-08
Payer: COMMERCIAL

## 2023-09-08 VITALS
SYSTOLIC BLOOD PRESSURE: 134 MMHG | RESPIRATION RATE: 18 BRPM | DIASTOLIC BLOOD PRESSURE: 84 MMHG | OXYGEN SATURATION: 94 % | WEIGHT: 165 LBS | TEMPERATURE: 98 F | HEART RATE: 94 BPM | BODY MASS INDEX: 28 KG/M2

## 2023-09-08 DIAGNOSIS — J98.01 ACUTE BRONCHOSPASM: ICD-10-CM

## 2023-09-08 DIAGNOSIS — J20.8 ACUTE VIRAL BRONCHITIS: Primary | ICD-10-CM

## 2023-09-08 LAB
S PYO AG THROAT QL IA.RAPID: NEGATIVE
SARS-COV-2 RNA RESP QL NAA+PROBE: NOT DETECTED

## 2023-09-08 PROCEDURE — 99213 OFFICE O/P EST LOW 20 MIN: CPT

## 2023-09-08 PROCEDURE — 87651 STREP A DNA AMP PROBE: CPT | Performed by: PHYSICIAN ASSISTANT

## 2023-09-08 PROCEDURE — 99214 OFFICE O/P EST MOD 30 MIN: CPT

## 2023-09-08 RX ORDER — PREDNISONE 20 MG/1
40 TABLET ORAL DAILY
Qty: 10 TABLET | Refills: 0 | Status: SHIPPED | OUTPATIENT
Start: 2023-09-08 | End: 2023-09-13

## 2023-09-08 RX ORDER — BENZONATATE 100 MG/1
100 CAPSULE ORAL 3 TIMES DAILY PRN
Qty: 30 CAPSULE | Refills: 0 | Status: SHIPPED | OUTPATIENT
Start: 2023-09-08 | End: 2023-10-08

## 2023-09-08 RX ORDER — CODEINE PHOSPHATE AND GUAIFENESIN 10; 100 MG/5ML; MG/5ML
10 SOLUTION ORAL NIGHTLY PRN
Qty: 150 ML | Refills: 0 | Status: SHIPPED | OUTPATIENT
Start: 2023-09-08

## 2023-09-08 NOTE — DISCHARGE INSTRUCTIONS
Please follow up with your PCP if no improvement within 5-7 days. Go directly to the ER for any acute worsening of symptoms. If you smoke, stop smoking. Push oral fluids to help loosen up chest mucous and move it out of your body. Use a cold mist humidifier. Get enough rest and sleep. Take Guaifenesin (Robitussin), an expectorant to loosen mucous. To suppress a dry, hacking cough, you may take Dextromethorphan (Robitussin DM)  If a bronchodilating inhaler is prescribed, follow instructions given. Seek immediate medical attention if symptoms worsen, if fever higher than 102, if no improvement in a few days. Symptoms usually last 10 days.

## 2023-09-08 NOTE — ED INITIAL ASSESSMENT (HPI)
Cough- dry cough started Tuesday. denies fever. C/o coughing spasms, voice hoarse. Pt took cold tablets. Pt did home covid test Tuesday and Wednesday result negative.

## 2024-05-20 ENCOUNTER — HOSPITAL ENCOUNTER (OUTPATIENT)
Age: 64
Discharge: HOME | End: 2024-05-20
Attending: EMERGENCY MEDICINE

## 2024-05-20 VITALS
SYSTOLIC BLOOD PRESSURE: 149 MMHG | BODY MASS INDEX: 28.17 KG/M2 | WEIGHT: 165 LBS | OXYGEN SATURATION: 95 % | RESPIRATION RATE: 20 BRPM | HEIGHT: 64 IN | HEART RATE: 110 BPM | DIASTOLIC BLOOD PRESSURE: 85 MMHG | TEMPERATURE: 99 F

## 2024-05-20 DIAGNOSIS — J40 BRONCHITIS: Primary | ICD-10-CM

## 2024-05-20 LAB
S PYO AG THROAT QL IA.RAPID: NEGATIVE
SARS-COV-2 RNA RESP QL NAA+PROBE: NOT DETECTED

## 2024-05-20 PROCEDURE — 99214 OFFICE O/P EST MOD 30 MIN: CPT

## 2024-05-20 PROCEDURE — 87651 STREP A DNA AMP PROBE: CPT | Performed by: EMERGENCY MEDICINE

## 2024-05-20 PROCEDURE — 99213 OFFICE O/P EST LOW 20 MIN: CPT

## 2024-05-20 RX ORDER — BENZONATATE 100 MG/1
100 CAPSULE ORAL 3 TIMES DAILY PRN
Qty: 30 CAPSULE | Refills: 0 | Status: SHIPPED | OUTPATIENT
Start: 2024-05-20 | End: 2024-06-19

## 2024-05-29 NOTE — ED PROVIDER NOTES
Patient Seen in: [facility]      History     Chief Complaint   Patient presents with    Cough/URI    Sore Throat     Stated Complaint: Sore throat, Cough, Ear ache    Subjective:   HPI    64 yo female with several days of cough, congestion, sore throat and left ear pain. No documented fever.     Objective:   Past Medical History:    Cervicitis and endocervicitis              Past Surgical History:   Procedure Laterality Date    Other surgical history      cervix cryosurgery    Tubal ligation                  Social History     Socioeconomic History    Marital status:    Tobacco Use    Smoking status: Never    Smokeless tobacco: Never   Vaping Use    Vaping status: Never Used   Substance and Sexual Activity    Alcohol use: Yes     Alcohol/week: 0.0 standard drinks of alcohol     Comment: 2 times month    Drug use: No   Other Topics Concern    Caffeine Concern Yes     Comment: coffee daily    Exercise Yes   Social History Narrative    ** Merged History Encounter **                   Review of Systems    Positive for stated complaint: Sore throat, Cough, Ear ache  Other systems are as noted in HPI.  Constitutional and vital signs reviewed.      All other systems reviewed and negative except as noted above.    Physical Exam     ED Triage Vitals [05/20/24 1908]   /85   Pulse 110   Resp 20   Temp 99.1 °F (37.3 °C)   Temp src Temporal   SpO2 95 %   O2 Device None (Room air)       Current Vitals:   No data recorded        Physical Exam  Vitals and nursing note reviewed.   Constitutional:       Appearance: Normal appearance. She is well-developed.   HENT:      Head: Normocephalic and atraumatic.      Right Ear: Tympanic membrane normal.      Left Ear: Tympanic membrane normal.      Nose: Congestion present.      Mouth/Throat:      Mouth: Mucous membranes are moist.      Pharynx: Posterior oropharyngeal erythema present. No pharyngeal swelling or oropharyngeal exudate.      Tonsils: No tonsillar  exudate or tonsillar abscesses.   Cardiovascular:      Rate and Rhythm: Normal rate and regular rhythm.   Pulmonary:      Effort: Pulmonary effort is normal. No respiratory distress.      Breath sounds: Normal breath sounds.   Musculoskeletal:      Cervical back: Neck supple.   Lymphadenopathy:      Cervical: No cervical adenopathy.   Skin:     General: Skin is warm and dry.      Capillary Refill: Capillary refill takes less than 2 seconds.   Neurological:      General: No focal deficit present.      Mental Status: She is alert.      Sensory: No sensory deficit.   Psychiatric:         Mood and Affect: Mood normal.         Behavior: Behavior normal.              ED Course     Labs Reviewed   RAPID STREP A - Normal   RAPID SARS-COV-2 BY PCR - Normal                      MDM                                      Medical Decision Making  Otitis media, pneumonia, bronchitis, strep and COVID all in differential. Covid and strep both negative. Exam findings consistent with viral bronchitis. Benzonatate prescribed for cough.     Disposition and Plan     Clinical Impression:  1. Bronchitis         Disposition:  Discharge  5/20/2024  7:39 pm    Follow-up:  Jose De Leon MD  [address] [place] [address]  [phone]      As needed          Medications Prescribed:  Discharge Medication List as of 5/20/2024  7:44 PM        START taking these medications    Details   benzonatate 100 MG Oral Cap Take 1 capsule (100 mg total) by mouth 3 (three) times daily as needed for cough., Normal, Disp-30 capsule, R-0

## 2024-07-19 ENCOUNTER — HOSPITAL ENCOUNTER (INPATIENT)
Facility: HOSPITAL | Age: 64
LOS: 2 days | Discharge: HOME | End: 2024-07-21
Attending: EMERGENCY MEDICINE | Admitting: HOSPITALIST
Payer: COMMERCIAL

## 2024-07-19 ENCOUNTER — APPOINTMENT (OUTPATIENT)
Dept: CT IMAGING | Facility: HOSPITAL | Age: 64
End: 2024-07-19
Attending: EMERGENCY MEDICINE
Payer: COMMERCIAL

## 2024-07-19 ENCOUNTER — OFFICE VISIT (OUTPATIENT)
Dept: FAMILY MEDICINE CLINIC | Facility: CLINIC | Age: 64
End: 2024-07-19
Payer: COMMERCIAL

## 2024-07-19 ENCOUNTER — APPOINTMENT (OUTPATIENT)
Dept: MRI IMAGING | Facility: HOSPITAL | Age: 64
End: 2024-07-19
Attending: EMERGENCY MEDICINE
Payer: COMMERCIAL

## 2024-07-19 VITALS
RESPIRATION RATE: 18 BRPM | BODY MASS INDEX: 29.37 KG/M2 | WEIGHT: 172 LBS | SYSTOLIC BLOOD PRESSURE: 178 MMHG | OXYGEN SATURATION: 100 % | HEIGHT: 64 IN | HEART RATE: 69 BPM | DIASTOLIC BLOOD PRESSURE: 96 MMHG

## 2024-07-19 DIAGNOSIS — I10 HYPERTENSION, UNSPECIFIED TYPE: Primary | ICD-10-CM

## 2024-07-19 DIAGNOSIS — I61.0 HEMORRHAGE IN CAUDATE NUCLEUS (HCC): ICD-10-CM

## 2024-07-19 DIAGNOSIS — R42 LIGHTHEADEDNESS: Primary | ICD-10-CM

## 2024-07-19 DIAGNOSIS — R42 DIZZINESS: ICD-10-CM

## 2024-07-19 DIAGNOSIS — R29.818 AURA: ICD-10-CM

## 2024-07-19 DIAGNOSIS — R94.31 ABNORMAL EKG: ICD-10-CM

## 2024-07-19 DIAGNOSIS — I10 PRIMARY HYPERTENSION: ICD-10-CM

## 2024-07-19 LAB
ALBUMIN SERPL-MCNC: 4.6 G/DL (ref 3.2–4.8)
ALBUMIN/GLOB SERPL: 1.7 {RATIO} (ref 1–2)
ALP LIVER SERPL-CCNC: 70 U/L
ALT SERPL-CCNC: 43 U/L
ANION GAP SERPL CALC-SCNC: 5 MMOL/L (ref 0–18)
AST SERPL-CCNC: 26 U/L (ref ?–34)
ATRIAL RATE: 65 BPM
BASOPHILS # BLD AUTO: 0.06 X10(3) UL (ref 0–0.2)
BASOPHILS NFR BLD AUTO: 0.8 %
BILIRUB SERPL-MCNC: 0.7 MG/DL (ref 0.2–1.1)
BUN BLD-MCNC: 16 MG/DL (ref 9–23)
CALCIUM BLD-MCNC: 9.2 MG/DL (ref 8.7–10.4)
CHLORIDE SERPL-SCNC: 108 MMOL/L (ref 98–112)
CHOLEST SERPL-MCNC: 218 MG/DL (ref ?–200)
CO2 SERPL-SCNC: 27 MMOL/L (ref 21–32)
CREAT BLD-MCNC: 0.77 MG/DL
EGFRCR SERPLBLD CKD-EPI 2021: 86 ML/MIN/1.73M2 (ref 60–?)
EOSINOPHIL # BLD AUTO: 0.16 X10(3) UL (ref 0–0.7)
EOSINOPHIL NFR BLD AUTO: 2 %
ERYTHROCYTE [DISTWIDTH] IN BLOOD BY AUTOMATED COUNT: 12.4 %
GLOBULIN PLAS-MCNC: 2.7 G/DL (ref 2.8–4.4)
GLUCOSE BLD-MCNC: 114 MG/DL (ref 70–99)
HCT VFR BLD AUTO: 39.7 %
HDLC SERPL-MCNC: 39 MG/DL (ref 40–59)
HGB BLD-MCNC: 14 G/DL
IMM GRANULOCYTES # BLD AUTO: 0.03 X10(3) UL (ref 0–1)
IMM GRANULOCYTES NFR BLD: 0.4 %
LDLC SERPL CALC-MCNC: 128 MG/DL (ref ?–100)
LYMPHOCYTES # BLD AUTO: 2.35 X10(3) UL (ref 1–4)
LYMPHOCYTES NFR BLD AUTO: 30.1 %
MCH RBC QN AUTO: 31.3 PG (ref 26–34)
MCHC RBC AUTO-ENTMCNC: 35.3 G/DL (ref 31–37)
MCV RBC AUTO: 88.6 FL
MONOCYTES # BLD AUTO: 0.53 X10(3) UL (ref 0.1–1)
MONOCYTES NFR BLD AUTO: 6.8 %
NEUTROPHILS # BLD AUTO: 4.69 X10 (3) UL (ref 1.5–7.7)
NEUTROPHILS # BLD AUTO: 4.69 X10(3) UL (ref 1.5–7.7)
NEUTROPHILS NFR BLD AUTO: 59.9 %
NONHDLC SERPL-MCNC: 179 MG/DL (ref ?–130)
OSMOLALITY SERPL CALC.SUM OF ELEC: 292 MOSM/KG (ref 275–295)
P AXIS: 3 DEGREES
P-R INTERVAL: 142 MS
PLATELET # BLD AUTO: 325 10(3)UL (ref 150–450)
POTASSIUM SERPL-SCNC: 3.5 MMOL/L (ref 3.5–5.1)
PROT SERPL-MCNC: 7.3 G/DL (ref 5.7–8.2)
Q-T INTERVAL: 390 MS
QRS DURATION: 84 MS
QTC CALCULATION (BEZET): 405 MS
R AXIS: -6 DEGREES
RBC # BLD AUTO: 4.48 X10(6)UL
SODIUM SERPL-SCNC: 140 MMOL/L (ref 136–145)
T AXIS: 15 DEGREES
TRIGL SERPL-MCNC: 288 MG/DL (ref 30–149)
TROPONIN I SERPL HS-MCNC: 3 NG/L
VENTRICULAR RATE: 65 BPM
VLDLC SERPL CALC-MCNC: 53 MG/DL (ref 0–30)
WBC # BLD AUTO: 7.8 X10(3) UL (ref 4–11)

## 2024-07-19 PROCEDURE — 3077F SYST BP >= 140 MM HG: CPT | Performed by: FAMILY MEDICINE

## 2024-07-19 PROCEDURE — 70551 MRI BRAIN STEM W/O DYE: CPT | Performed by: EMERGENCY MEDICINE

## 2024-07-19 PROCEDURE — 93000 ELECTROCARDIOGRAM COMPLETE: CPT | Performed by: FAMILY MEDICINE

## 2024-07-19 PROCEDURE — 3080F DIAST BP >= 90 MM HG: CPT | Performed by: FAMILY MEDICINE

## 2024-07-19 PROCEDURE — 70496 CT ANGIOGRAPHY HEAD: CPT | Performed by: EMERGENCY MEDICINE

## 2024-07-19 PROCEDURE — 70498 CT ANGIOGRAPHY NECK: CPT | Performed by: EMERGENCY MEDICINE

## 2024-07-19 PROCEDURE — 99214 OFFICE O/P EST MOD 30 MIN: CPT | Performed by: FAMILY MEDICINE

## 2024-07-19 PROCEDURE — 3008F BODY MASS INDEX DOCD: CPT | Performed by: FAMILY MEDICINE

## 2024-07-19 PROCEDURE — 99222 1ST HOSP IP/OBS MODERATE 55: CPT | Performed by: STUDENT IN AN ORGANIZED HEALTH CARE EDUCATION/TRAINING PROGRAM

## 2024-07-19 RX ORDER — ONDANSETRON 2 MG/ML
4 INJECTION INTRAMUSCULAR; INTRAVENOUS EVERY 6 HOURS PRN
Status: DISCONTINUED | OUTPATIENT
Start: 2024-07-19 | End: 2024-07-21

## 2024-07-19 RX ORDER — HYDRALAZINE HYDROCHLORIDE 20 MG/ML
10 INJECTION INTRAMUSCULAR; INTRAVENOUS EVERY 2 HOUR PRN
Status: DISCONTINUED | OUTPATIENT
Start: 2024-07-19 | End: 2024-07-21

## 2024-07-19 RX ORDER — SENNOSIDES 8.6 MG
17.2 TABLET ORAL NIGHTLY PRN
Status: DISCONTINUED | OUTPATIENT
Start: 2024-07-19 | End: 2024-07-21

## 2024-07-19 RX ORDER — ENEMA 19; 7 G/133ML; G/133ML
1 ENEMA RECTAL ONCE AS NEEDED
Status: DISCONTINUED | OUTPATIENT
Start: 2024-07-19 | End: 2024-07-21

## 2024-07-19 RX ORDER — POLYETHYLENE GLYCOL 3350 17 G/17G
17 POWDER, FOR SOLUTION ORAL DAILY PRN
Status: DISCONTINUED | OUTPATIENT
Start: 2024-07-19 | End: 2024-07-21

## 2024-07-19 RX ORDER — LABETALOL HYDROCHLORIDE 5 MG/ML
10 INJECTION, SOLUTION INTRAVENOUS EVERY 10 MIN PRN
Status: DISCONTINUED | OUTPATIENT
Start: 2024-07-19 | End: 2024-07-21

## 2024-07-19 RX ORDER — METOCLOPRAMIDE HYDROCHLORIDE 5 MG/ML
10 INJECTION INTRAMUSCULAR; INTRAVENOUS EVERY 8 HOURS PRN
Status: DISCONTINUED | OUTPATIENT
Start: 2024-07-19 | End: 2024-07-21

## 2024-07-19 RX ORDER — ACETAMINOPHEN 500 MG
1000 TABLET ORAL EVERY 8 HOURS PRN
Status: DISCONTINUED | OUTPATIENT
Start: 2024-07-19 | End: 2024-07-21

## 2024-07-19 RX ORDER — ALPRAZOLAM 0.25 MG/1
0.25 TABLET ORAL NIGHTLY PRN
Status: DISCONTINUED | OUTPATIENT
Start: 2024-07-19 | End: 2024-07-21

## 2024-07-19 RX ORDER — HYDRALAZINE HYDROCHLORIDE 20 MG/ML
10 INJECTION INTRAMUSCULAR; INTRAVENOUS ONCE
Status: DISCONTINUED | OUTPATIENT
Start: 2024-07-19 | End: 2024-07-19

## 2024-07-19 RX ORDER — DEXTROSE MONOHYDRATE AND SODIUM CHLORIDE 5; .45 G/100ML; G/100ML
INJECTION, SOLUTION INTRAVENOUS CONTINUOUS
OUTPATIENT
Start: 2024-07-19 | End: 2024-07-19

## 2024-07-19 RX ORDER — ATORVASTATIN CALCIUM 40 MG/1
40 TABLET, FILM COATED ORAL NIGHTLY
Status: DISCONTINUED | OUTPATIENT
Start: 2024-07-19 | End: 2024-07-21

## 2024-07-19 RX ORDER — BENZONATATE 100 MG/1
200 CAPSULE ORAL 3 TIMES DAILY PRN
Status: DISCONTINUED | OUTPATIENT
Start: 2024-07-19 | End: 2024-07-21

## 2024-07-19 RX ORDER — HYDRALAZINE HYDROCHLORIDE 20 MG/ML
5 INJECTION INTRAMUSCULAR; INTRAVENOUS EVERY 6 HOURS PRN
Status: DISCONTINUED | OUTPATIENT
Start: 2024-07-19 | End: 2024-07-21

## 2024-07-19 RX ORDER — SODIUM CHLORIDE 9 MG/ML
INJECTION, SOLUTION INTRAVENOUS CONTINUOUS
Status: DISCONTINUED | OUTPATIENT
Start: 2024-07-19 | End: 2024-07-21

## 2024-07-19 RX ORDER — LABETALOL HYDROCHLORIDE 5 MG/ML
20 INJECTION, SOLUTION INTRAVENOUS ONCE
Status: COMPLETED | OUTPATIENT
Start: 2024-07-19 | End: 2024-07-19

## 2024-07-19 RX ORDER — ECHINACEA PURPUREA EXTRACT 125 MG
1 TABLET ORAL
Status: DISCONTINUED | OUTPATIENT
Start: 2024-07-19 | End: 2024-07-21

## 2024-07-19 RX ORDER — BISACODYL 10 MG
10 SUPPOSITORY, RECTAL RECTAL
Status: DISCONTINUED | OUTPATIENT
Start: 2024-07-19 | End: 2024-07-21

## 2024-07-19 RX ORDER — HYDRALAZINE HYDROCHLORIDE 20 MG/ML
10 INJECTION INTRAMUSCULAR; INTRAVENOUS ONCE
Status: COMPLETED | OUTPATIENT
Start: 2024-07-19 | End: 2024-07-19

## 2024-07-19 NOTE — PROGRESS NOTES
HISTORY:  Chief Complaint   Patient presents with    Hypertension     PT reports dizziness and lightheadedness and has been taking at home readings. Readings at home average around 160/100    Pt is planning to go camping in a tent for a week starting tomorrow.   Pt here with daughter   Pt here for high bp lightheadedness dizziness  starting Monday no syncope   On Monday she had epidose of lightheadedness near syncope water glass falling from hand and what they describe an alexander right side   Since then her bp has been 160s /100-105  Says dizziness slowely improving felt shaky today   No cp or sob denies palpitations  Denies significant headache   Pt planning camping trip with family tomorrow in [facility]   The following portions of the patient's history were reviewed and updated as appropriate:  Past Medical History:    Cervicitis and endocervicitis     Patient Active Problem List   Diagnosis    Unspecified hypothyroidism    Hypercholesterolemia     Past Surgical History:   Procedure Laterality Date    Other surgical history      cervix cryosurgery    Tubal ligation       Family History   Problem Relation Age of Onset    High Cholesterol Father     Hypertension Father     Breast Cancer Paternal Grandmother 60        In her 60's.    Hypertension Mother     Thyroid Disorder Mother     High Cholesterol Mother     Other (colitis) Mother     High Cholesterol Sister     Hypertension Sister     High Cholesterol Brother     Hypertension Brother      Social History     Socioeconomic History    Marital status:    Tobacco Use    Smoking status: Never    Smokeless tobacco: Never   Vaping Use    Vaping status: Never Used   Substance and Sexual Activity    Alcohol use: Yes     Alcohol/week: 0.0 standard drinks of alcohol     Comment: 2 times month    Drug use: No   Other Topics Concern    Caffeine Concern Yes     Comment: coffee daily    Exercise Yes       No current outpatient medications on file.       No Known  Allergies      Review of Systems  Const: Denies fever chills  Eyes: Denies drainage no pain with movement of eye  ENT: denies sore throat,no ear pain ,no sinus drainage  CV: Denies chest pain or palpitations  Pulm: Denies shortness of breath  GI: Denies abdominal pain, nausea, vomiting or diarrhea  : Denies dysuria  Musculoskeletal: Denies neck or back pain  Skin: Denies rashes  Neuro: Denies focal weakness or numbness,  + dizziness lightheadedness       Vitals:    07/19/24 1231   BP: (!) 178/96   Pulse: 69   Resp: 18   SpO2: 100%   Weight: 172 lb (78 kg)   Height: 5' 4\" (1.626 m)        Physical Exam  General Appearance:  Alert, oriented, in no acute distress  Eyes no discharge  Neck ;soft supple,no obvious swelling  CNS: no acute focal neurological deficits  Chest Wall:  no chest wall tenderness.  Lungs:  Normal expansion.  Clear to auscultation.   Heart:  Heart sounds are normal.    Abdomen:  Soft, non-tender, normal bowel sounds;  Psych mood and affect appropriate  skin ;no obvious skin lesion in exposed area  Lower Extremities: No gross edema,        Assessment/Plan:    Herlinda was seen today for hypertension.    Diagnoses and all orders for this visit:    Lightheadedness  -     EKG with interpretation and Report -IN OFFICE [01057]  EKG no ac findings  Given his issues with lightheadedness /dizziness  and the aura she had along with glass falling from her hand and htn   I will have her seen in er for more work up   Labs /likely ct   Meds    D/d including tia and effects of bp and risks of strokes and cardiac issues from bp dw them   Dw pt and daughter they will go to er now   Fu with pcp early next week for fu on bp and symptoms  Watch bp closely   Keep hydration today to er   Daughter will take her now  Primary hypertension  -     EKG with interpretation and Report -IN OFFICE [[id number]]    Dizziness  As above   Aura  As above   Abn EKG   Get bp under control  Fu with pcp can get echo near future to look for lvh  with pcp   Patient Active Problem List   Diagnosis    Unspecified hypothyroidism    Hypercholesterolemia       Patient's Body mass index is 29.52 kg/m².    .    Return in about 4 days (around 7/23/2024).    No current [facility]-ordered outpatient medications on file.    Rey Boss MD  7/19/2024

## 2024-07-19 NOTE — ED PROVIDER NOTES
Patient Seen in: [facility] Emergency Department      History     Chief Complaint   Patient presents with    Hypertension     Stated Complaint: 179/96 BP in MD office, c/o headache,    Subjective:   HPI    This is a right-handed 63-year-old female no significant past medical history who presents with hypertension.  Patient reports on Monday afternoon she was not feeling good she felt lightheaded and felt off balance.  She felt like her right arm and the right leg were weak.  Her daughter gave her a glass of water and she dropped it because she could not hang onto it.  She also states she has some wavy lines in her vision in her right eye.  Speech was clear.  She denied any headache.  Her blood pressure was elevated 160/100 at that time.  The symptoms lasted about 20 minutes and went away.  She called her doctor the next day but cannot get in till July 31.  She is been monitoring her blood pressure throughout the week and it has been running over the 160s over 90s.  Today she felt weak and fatigued and was concerned and thought she should come and get checked out because she is due to go on a camping trip.  Currently she denies any symptoms.  No chest pain or shortness of breath.  No abdominal pain.  She states she is never been told she had hypertension she is never been on medications.  Hypertension does run in her family.  She does not smoke tobacco.  She drinks alcohol socially.  She presents here with her family for further evaluation.    Objective:   Past Medical History:    Cervicitis and endocervicitis              Past Surgical History:   Procedure Laterality Date    Other surgical history      cervix cryosurgery    Tubal ligation                  Social History     Socioeconomic History    Marital status:    Tobacco Use    Smoking status: Never    Smokeless tobacco: Never   Vaping Use    Vaping status: Never Used   Substance and Sexual Activity    Alcohol use: Yes     Alcohol/week: 0.0  standard drinks of alcohol     Comment: 2 times month    Drug use: No   Other Topics Concern    Caffeine Concern Yes     Comment: coffee daily    Exercise Yes   Social History Narrative    ** Merged History Encounter **                   Review of Systems    Positive for stated Chief Complaint: Hypertension    Other systems are as noted in HPI.  Constitutional and vital signs reviewed.      All other systems reviewed and negative except as noted above.    Physical Exam     ED Triage Vitals   BP 07/19/24 1628 151/89   Pulse 07/19/24 1628 85   Resp 07/19/24 1628 18   Temp 07/19/24 1628 98.1 °F (36.7 °C)   Temp src 07/19/24 1628 Temporal   SpO2 07/19/24 1628 96 %   O2 Device 07/19/24 1800 None (Room air)       Current Vitals:   Vital Signs  BP: 157/80  Pulse: 66  Resp: 11  Temp: 98.1 °F (36.7 °C)  Temp src: Temporal  MAP (mmHg): (!) 101    Oxygen Therapy  SpO2: 99 %  O2 Device: None (Room air)            Physical Exam    GENERAL: Awake, alert oriented x3, nontoxic appearing.   SKIN: Normal, warm, and dry.  HEENT:  Pupils equally round and reactive to light. Conjuctiva clear.  Oropharynx is clear and moist.   Lungs: Clear to auscultation bilaterally with no rales, no retractions, and no wheezing.  HEART:  Regular rate and rhythm. S1 and S2. No murmurs, no rubs or gallops.   ABDOMEN: Soft, nontender and nondistended. Normoactive bowel sounds. No rebound. No guarding.   EXTREMITIES: Warm with brisk capillary refill.   Neuro: Speech clear. No facial asymmetry. No pronator drift. Motor strength 5 out of 5 in upper and lower extremities. Patient can ambulate with steady gait.         ED Course     Labs Reviewed   COMP METABOLIC PANEL (14) - Abnormal; Notable for the following components:       Result Value    Glucose 114 (*)     Globulin  2.7 (*)     All other components within normal limits   TROPONIN I HIGH SENSITIVITY - Normal   CBC WITH DIFFERENTIAL WITH PLATELET    Narrative:     The following orders were created for  panel order CBC With Differential With Platelet.  Procedure                               Abnormality         Status                     ---------                               -----------         ------                     CBC W/ DIFFERENTIAL[[id number]]                              Final result                 Please view results for these tests on the individual orders.   RAINBOW DRAW LAVENDER   RAINBOW DRAW LIGHT GREEN   CBC W/ DIFFERENTIAL     EKG    Rate, intervals and axes as noted on EKG Report.  Rate: 70  Rhythm: Sinus Rhythm  Reading: No acute changes.               MRI BRAIN (CPT=70551)    Result Date: 7/19/2024  PROCEDURE:  MRI BRAIN (CPT=70551)  COMPARISON:  None.  INDICATIONS:  179/96 BP in MD office, c/o headache,  TECHNIQUE:  MRI of the brain was performed with multi-planar T1, T2-weighted images with FLAIR sequences and diffusion weighted images without infusion.  PATIENT STATED HISTORY: (As transcribed by Technologist)  Patient states having headaches    FINDINGS:  INTRACRANIAL:  Foci of T2/FLAIR hyperintensity with T1 mild hyperintensity noted within the left caudate head measuring 7 x 5 mm consistent with a subacute hemorrhage.  There is mild susceptibility signal on SWI within the lesion suggesting that the hemorrhages a few days old.  There is also an old lacunar infarct within the left centrum semiovale.  Diffusion weighted imaging was performed and is unremarkable.  There is no evidence for acute infarction.  There is no evidence of hemorrhage or mass lesion.  VENTRICLES/SULCI:   Ventricles and sulci are normal in caliber.  There are no extra-axial fluid collections. There is no midline shift. SINUSES/ORBITS:       The visualized paranasal sinuses are clear.  The orbits are unremarkable. MASTOIDS:      The mastoids are clear.            CONCLUSION:   Acute to subacute small hemorrhage involving the left caudate head.  Findings discussed with Dr. Patton at 2018 hours on 7/19/2024 with read  back.  LOCATION:  [place]   Dictated by (CST): Koby Culver MD on 7/19/2024 at 8:11 PM     Finalized by (CST): Koby Culver MD on 7/19/2024 at 8:18 PM                [facility] 0      This is a right-handed 63-year-old female no significant past medical history who presents with hypertension.  Patient reports on Monday afternoon she was not feeling good she felt lightheaded and felt off balance.  She felt like her right arm and the right leg were weak.  Her daughter gave her a glass of water and she dropped it because she could not hang onto it.  She also states she has some wavy lines in her vision in her right eye.  Differential include stroke, hypertensive urgency, intracranial hemorrhage at this is a life threat.      Patient placed on cardiac monitor, continuous pulse oximetry and IV line was established of normal saline.  patient was given IV labetalol for hypertension.  Blood pressure improved to 148/79.  Basic labs were obtained.  CBC: White blood cell count 7.8.  Hemoglobin 14.  Platelet 325.  CMP: BUN 16 paragon 0.7 per glucose 114.  Bicarb 27.  Troponin 3.      Discussed case with neurology Dr. Diaz    Discussed case with neurosurgery Dr. Zuluaga agrees with current management.    MRI brain was obtained demonstrated acute to subacute small hemorrhage involving the left caudate head      Discussed findings with Dr. Diaz.  Will place neurosurgery on consult and admit patient.  Also ordered CT brain/carotids at his request.  CT demonstrated left caudate head lesion which may represent subacute to chronic infarct or hemorrhage.    Patient and family informed of imaging findings.  Informed of need for admission.  Expresses understanding.    Discussed with [facility] hospitalist Dr. Hummel.          Disposition and Plan     Clinical Impression:  1. Hypertension, unspecified type    2. Hemorrhage in caudate nucleus (HCC)         Disposition:  Admit  7/19/2024  9:37 pm    Follow-up:  Jose De Leon  MD  [address] [place] [id number]  [phone]    Follow up on 7/22/2024            Medications Prescribed:  Current Discharge Medication List                            Hospital Problems       Present on Admission  Date Reviewed: 7/19/2024            ICD-10-CM Noted POA    * (Principal) Hypertension, unspecified type I10 7/19/2024 Unknown

## 2024-07-19 NOTE — PATIENT INSTRUCTIONS
Lifestyle Modification:   (AVG. SBP = Average Systolic Blood Pressure)  Lifestyle Modification Recommendations  Modification Recommendation Avg. SBP Reduction Range   Weight Reduction Maintain normal body weight (body mass index 18.5-24.9 kg/m2) 5-20 mmHg/10 kg   DASH eating plan Adopt a diet rich in fruits, vegetables, and low fat dairy products with reduced content of saturated and total fat. 8-14 mmHg   Dietary sodium reduction Reduce dietary sodium intake to <= 100 mmol per day (2.4 g sodium or 6 g sodium chloride) 2-8 mmHg   Aerobic physical activity Regular aerobic physical activity (e.g., brisk walking, light jogging, cycling, swimming, etc.) for a goal of at least 150 minutes per week. 4-9 mmHg   Moderation of alcohol consumption Men: limit to <= 2 drinks* per day.  Women and lighter weight persons: limit to <= 1 drink* per day. 2-4 mmHg      * 1 drink = ½ oz or 15 mL ethanol (e.g. 12 oz beer, 5 oz wine, 1.5 oz 80-proof whiskey.   Effects are dose and time dependent.

## 2024-07-20 LAB
EST. AVERAGE GLUCOSE BLD GHB EST-MCNC: 103 MG/DL (ref 68–126)
GLUCOSE BLD-MCNC: 107 MG/DL (ref 70–99)
HBA1C MFR BLD: 5.2 % (ref ?–5.7)
INR BLD: 1.06 (ref 0.8–1.2)
PROTHROMBIN TIME: 13.9 SECONDS (ref 11.6–14.8)

## 2024-07-20 PROCEDURE — 99223 1ST HOSP IP/OBS HIGH 75: CPT | Performed by: OTHER

## 2024-07-20 PROCEDURE — 99232 SBSQ HOSP IP/OBS MODERATE 35: CPT | Performed by: HOSPITALIST

## 2024-07-20 RX ORDER — AMLODIPINE BESYLATE 5 MG/1
5 TABLET ORAL DAILY
Status: DISCONTINUED | OUTPATIENT
Start: 2024-07-20 | End: 2024-07-21

## 2024-07-20 RX ORDER — AMLODIPINE BESYLATE 5 MG/1
5 TABLET ORAL DAILY
Qty: 90 TABLET | Refills: 1 | Status: SHIPPED | OUTPATIENT
Start: 2024-07-20 | End: 2024-07-21

## 2024-07-20 RX ORDER — ATORVASTATIN CALCIUM 40 MG/1
40 TABLET, FILM COATED ORAL NIGHTLY
Qty: 90 TABLET | Refills: 1 | Status: SHIPPED | OUTPATIENT
Start: 2024-07-20

## 2024-07-20 NOTE — ED QUICK NOTES
Orders for admission, patient is aware of plan and ready to go upstairs. Any questions, please call ED RN Leonila at extension [phone].     Patient Covid vaccination status: Partially vaccinated     COVID Test Ordered in ED: None    COVID Suspicion at Admission: N/A    Running Infusions:  None    Mental Status/LOC at time of transport: A&Ox4    Other pertinent information:   CIWA score: N/A   NIH score:  0         What Type Of Note Output Would You Prefer (Optional)?: Bullet Format

## 2024-07-20 NOTE — OCCUPATIONAL THERAPY NOTE
OCCUPATIONAL THERAPY EVALUATION - INPATIENT    Room Number: 7615/7615-A  Evaluation Date: 7/20/2024     Type of Evaluation: Initial  Presenting Problem: Hypertension, hemorrhage in caudate nucleus    Physician Order: IP Consult to Occupational Therapy  Reason for Therapy:  ADL/IADL Dysfunction and Discharge Planning      OCCUPATIONAL THERAPY ASSESSMENT   Patient is a 63 year old female admitted on 7/19/2024 with Presenting Problem: Hypertension, hemorrhage in caudate nucleus. Co-Morbidities : hypothryoidism  Patient is currently functioning at baseline with self-care and functional mobility.  Prior to admission, patient's baseline is independent.  Patient met all OT goals at independent to mod I level.  Patient reports no further questions/concerns at this time.     Patient will be discharged from OT services at this time.  Will benefit from return to home setting at discharge from hospital.        WEIGHT BEARING RESTRICTION  Weight Bearing Restriction: None                Recommendations for nursing staff:   Transfers: independent  Toileting location: Toilet    EVALUATION SESSION:  Patient at start of session: supine  FUNCTIONAL TRANSFER ASSESSMENT  Sit to Stand: Edge of Bed; Chair  Edge of Bed: Independent  Chair: Independent  Toilet Transfer: Independent    BED MOBILITY  Rolling: Independent  Supine to Sit : Independent  Sit to Supine (OT): Independent  Scooting: independent    BALANCE ASSESSMENT  Static Sitting: Independent  Sitting Bilateral: Independent  Static Standing: Independent  Standing Bilateral: Independent    FUNCTIONAL ADL ASSESSMENT  Eating: Independent  Grooming Seated: Independent  LB Dressing Seated: Independent  Toileting Seated: Independent      ACTIVITY TOLERANCE: WFL  EOB: [birth year]                           O2 SATURATIONS       COGNITION  Attention Span:  appears intact  Orientation Level:  oriented x4  Memory:  appears intact  Following Commands:  follows all commands and directions without  difficulty  Motor Planning: intact  Safety Judgement:  good awareness of safety precautions  COGNITION ASSESSMENTS       Upper Extremity:   ROM: within functional limits   Strength: is within functional limits   Coordination:  Gross motor: WFL  Fine motor: WFL  Sensation: Light touch:  intact  Proprioception:  intact  Stereognosis:  intact    EDUCATION PROVIDED  Patient: Role of Occupational Therapy; Plan of Care; Discharge Recommendations; Fall Prevention  Patient's Response to Education: Verbalized Understanding; Returned Demonstration  Family/Caregiver: Role of Occupational Therapy; Plan of Care  Family/Caregiver's Response to Education: Verbalized Understanding    Equipment used: none  Demonstrates functional use    Therapist comments: OT educated patient on safety,  sequencing, energy conservation, pain management, home modifications and adaptive equipment to increase independence with ADLs.      Patient End of Session: Up in chair;Needs met;Call light within reach;RN aware of session/findings;All patient questions and concerns addressed;Family present;Discussed recommendations with /    OCCUPATIONAL PROFILE    HOME SITUATION  Type of Home: House  Home Layout: Two level  Lives With: Spouse;Daughter    Toilet and Equipment: Standard height toilet  Shower/Tub and Equipment: Tub-shower combo  Other Equipment: None    Occupation/Status:  to a [employer] ([employer])  Hand Dominance: Right  Drives: Yes  Patient Regularly Uses: Reading glasses    Prior Level of Function: independent with all ADL/IADLs without use of adaptive device.    SUBJECTIVE    PAIN ASSESSMENT  Rating: Unable to rate  Location: slight headache  Management Techniques: Nurse notified    OBJECTIVE  Precautions: None  Fall Risk: Standard fall risk    WEIGHT BEARING RESTRICTION  Weight Bearing Restriction: None                AM-PAC ‘6-Clicks’ Inpatient Daily Activity Short Form  -   Putting on and taking off regular  lower body clothing?: None  -   Bathing (including washing, rinsing, drying)?: None  -   Toileting, which includes using toilet, bedpan or urinal? : None  -   Putting on and taking off regular upper body clothing?: None  -   Taking care of personal grooming such as brushing teeth?: None  -   Eating meals?: None    AM-PAC Score:  Score: 24  Approx Degree of Impairment: 0%  Standardized Score (AM-PAC Scale): 57.54      ADDITIONAL TESTS  PHQ9: Yes    NEUROLOGICAL FINDINGS  Coordination - Finger to Nose: Symmetrical  Coordination - Rapid Alternating Movement: Symmetrical  Coordination - Finger Opposition: Symmetrical       PLAN   Patient has been evaluated and presents with no skilled Occupational Therapy needs at this time.  Patient discharged from Occupational Therapy services.  Please re-order if a new functional limitation presents during this admission.      Patient Evaluation Complexity Level:   Occupational Profile/Medical History LOW - Brief history including review of medical or therapy records    Specific performance deficits impacting engagement in ADL/IADL LOW  1 - 3 performance deficits    Client Assessment/Performance Deficits LOW - No comorbidities nor modifications of tasks    Clinical Decision Making LOW - Analysis of occupational profile, problem-focused assessments, limited treatment options    Overall Complexity LOW     OT Session Time: 30 minutes  Self-Care Home Management: 15 minutes

## 2024-07-20 NOTE — PROGRESS NOTES
[facility]   part of [facility]     Hospitalist Progress Note     Herlinda Hernandez Patient Status:  Inpatient    10/24/1960 MRN [id number]   Location [facility] 7NE-A Attending Becky Rojas DO   Hosp Day # 1 PCP Jose De Leon MD     Chief Complaint: Dizziness    Subjective:     Patient denies dizziness    Objective:    Review of Systems:   A comprehensive review of systems was completed; pertinent positive and negatives stated in subjective.    Vital signs:  Temp:  [97.6 °F (36.4 °C)-98.1 °F (36.7 °C)] 97.7 °F (36.5 °C)  Pulse:  [64-91] 81  Resp:  [11-22] 16  BP: (117-186)/(61-96) 142/69  SpO2:  [93 %-100 %] 96 %    Physical Exam:    General: No acute distress  Respiratory: No wheezes, no rhonchi  Cardiovascular: S1, S2, regular rate and rhythm  Abdomen: Soft, Non-tender, non-distended, positive bowel sounds  Neuro: No new focal deficits.   Extremities: No edema      Diagnostic Data:    Labs:  Recent Labs   Lab 24  1642 24  0617   WBC 7.8  --    HGB 14.0  --    MCV 88.6  --    .0  --    INR  --  1.06       Recent Labs   Lab 24  1642   *   BUN 16   CREATSERUM 0.77   CA 9.2   ALB 4.6      K 3.5      CO2 27.0   ALKPHO 70   AST 26   ALT 43   BILT 0.7   TP 7.3       Estimated Creatinine Clearance: 62.9 mL/min (based on SCr of 0.77 mg/dL).    Recent Labs   Lab 24  1642   TROPHS 3       Recent Labs   Lab 24  0617   PTP 13.9   INR 1.06                  Microbiology    No results found for this visit on 24.      Imaging: Reviewed in Epic.    Medications:    amLODIPine  5 mg Oral Daily    atorvastatin  40 mg Oral Nightly       Assessment & Plan:      #Acute dizziness, resolved  #Acute to subacute small hemorrhage involving the left body head  -Neurosurgery and neurology following  -Therapies to see  -Add low-dose amlodipine    #Hyperlipidemia  -Statin    #Dispo  -possible dc later today      Becky Rojas DO    Supplementary Documentation:      Quality:  DVT Mechanical Prophylaxis:   SCDs,    DVT Pharmacologic Prophylaxis   Medication   None                Code Status: Not on file  Lai: No urinary catheter in place  Lai Duration (in days):   Central line:    RICK:     Discharge is dependent on: clearance  from neuro  At this point Ms. Marian Hernandez is expected to be discharge to: home    The 21st Century Cures Act makes medical notes like these available to patients in the interest of transparency. Please be advised this is a medical document. Medical documents are intended to carry relevant information, facts as evident, and the clinical opinion of the practitioner. The medical note is intended as peer to peer communication and may appear blunt or direct. It is written in medical language and may contain abbreviations or verbiage that are unfamiliar.

## 2024-07-20 NOTE — PHYSICAL THERAPY NOTE
PHYSICAL THERAPY EVALUATION - INPATIENT     Room Number: 7615/7615-A  Evaluation Date: 2024  Type of Evaluation: Initial  Physician Order: PT Eval and Treat    Presenting Problem: admit with dizziness and R sided weakness. CTA with L caudate head lesion, may represent subacute to chronic infarct or hemorrhage per NSGY documentation  Co-Morbidities : hypothryoidism  Reason for Therapy: Mobility Dysfunction and Discharge Planning    PHYSICAL THERAPY ASSESSMENT   Patient is a 63 year old female admitted 2024 for dizziness, R sided weakness.   Patient is currently functioning at baseline with bed mobility, transfers, gait, and maintaining seated position. Prior to admission, patient's baseline is IND.     Patient currently functioning at her baseline, no further IP PT needs identified. IP PT to sign off at this time. Please re consult if functional change occurs.    PLAN  Patient has been evaluated and presents with no skilled Physical Therapy needs at this time.  Patient discharged from Physical Therapy services.  Please re-order if a new functional limitation presents during this admission.    GOALS  Patient was able to achieve the following goals ...    Patient was able to transfer At previous, functional level  Safely and independently   Patient able to ambulate on level surfaces At previous, functional level  Safely and independently         HOME SITUATION  Type of Home: House   Home Layout: Two level  Stairs to Enter : 2     Stairs to Bedroom: 10       Lives With: Spouse;Daughter  Drives: Yes  Patient Owned Equipment: None  Patient Regularly Uses: Reading glasses    Prior Level of [place]: IND with all ADLs/IADLs/ambulation. Works full time. Drives. No falls.     SUBJECTIVE  \"I work full time\"      OBJECTIVE  Precautions: None  Fall Risk: Standard fall risk    WEIGHT BEARING RESTRICTION  Weight Bearing Restriction: None                PAIN ASSESSMENT  Ratin          COGNITION  Overall  Cognitive Status:  WFL - within functional limits    RANGE OF MOTION AND STRENGTH ASSESSMENT  Upper extremity ROM and strength are within functional limits     Lower extremity ROM is within functional limits     Lower extremity strength is within functional limits       BALANCE  Static Sitting: Normal  Dynamic Sitting: Normal  Static Standing: Normal  Dynamic Standing: Good    ADDITIONAL TESTS                                    ACTIVITY TOLERANCE                         O2 WALK       NEUROLOGICAL FINDINGS  Neurological Findings: None;Coordination - Rapid Alternating Movement        Coordination - Rapid Alternating Movement: Symmetrical            AM-PAC '6-Clicks' INPATIENT SHORT FORM - BASIC MOBILITY  How much difficulty does the patient currently have...  Patient Difficulty: Turning over in bed (including adjusting bedclothes, sheets and blankets)?: None   Patient Difficulty: Sitting down on and standing up from a chair with arms (e.g., wheelchair, bedside commode, etc.): None   Patient Difficulty: Moving from lying on back to sitting on the side of the bed?: None   How much help from another person does the patient currently need...   Help from Another: Moving to and from a bed to a chair (including a wheelchair)?: None   Help from Another: Need to walk in hospital room?: None   Help from Another: Climbing 3-5 steps with a railing?: None       AM-PAC Score:  Raw Score: 24   Approx Degree of Impairment: 0%   Standardized Score (AM-PAC Scale): 61.14   CMS Modifier (G-Code): CH    FUNCTIONAL ABILITY STATUS  Gait Assessment   Functional Mobility/Gait Assessment  Gait Assistance: Modified independent  Distance (ft): 200  Assistive Device: None  Pattern: Within Functional Limits    Skilled Therapy Provided     Bed Mobility:  Supine to sit: IND     Transfer Mobility:  Sit to stand: IND   Stand to sit: IND  Gait = IND, no device    Therapist's comments:pt educated on role of IP PT services, PT evaluation purpose, PT POC,  PT goals, device recommendations, discharge recommendations, and importance of mobility while hospitalized. Pt currently functioning at IND level.     Exercise/Education Provided:  Bed mobility  Body mechanics  Energy conservation  Functional activity tolerated  Transfer training    Patient End of Session: Up in chair;Call light within reach;Needs met;RN aware of session/findings;All patient questions and concerns addressed;Family present    Patient Evaluation Complexity Level:  History Low - no personal factors and/or co-morbidities   Examination of body systems Low - addressing 1-2 elements   Clinical Presentation Low - Stable   Clinical Decision Making Low Complexity       PT Session Time: 20 minutes

## 2024-07-20 NOTE — H&P
[facility]     Herlinda Hernandez Patient Status:  Emergency    10/24/1960 MRN [id number]   Location [facility] EMERGENCY DEPARTMENT Attending Nimisha Patton,    Hosp Day # 0 PCP Jose De Leon MD     Chief Complaint: Dizziness    Subjective:    History of Present Illness:     Herlinda Hernandez is a 63 year old female who this past Monday she reports feeling dizzy after coming inside the house from being outside. She felt her right side weak as well, her daughter handed her a cup of water which she dropped. Pt also notes visual changes in her right eye- transient looked like an aura of gentile she says.no speech changes, no headache, no n, no v. No falls/ no head trauma.  Dtr has been checking the pt BP range 160 SBP, 100 DBP pt is usually low/ normal and not on any meds    History/Other:    Past Medical History:  Past Medical History:    Cervicitis and endocervicitis     Past Surgical History:   Past Surgical History:   Procedure Laterality Date    Other surgical history      cervix cryosurgery    Tubal ligation        Family History:   Family History   Problem Relation Age of Onset    High Cholesterol Father     Hypertension Father     Breast Cancer Paternal Grandmother 60        In her 60's.    Hypertension Mother     Thyroid Disorder Mother     High Cholesterol Mother     Other (colitis) Mother     High Cholesterol Sister     Hypertension Sister     High Cholesterol Brother     Hypertension Brother      Social History:    reports that she has never smoked. She has never used smokeless tobacco. She reports current alcohol use. She reports that she does not use drugs.     Allergies: No Known Allergies    Medications:    No current facility-administered medications on file prior to encounter.     Current Outpatient Medications on File Prior to Encounter   Medication Sig Dispense Refill    [] benzonatate 100 MG Oral Cap Take 1 capsule (100 mg total) by mouth 3  (three) times daily as needed for cough. 30 capsule 0       Review of Systems:   A comprehensive review of systems was completed.    Pertinent positives and negatives noted in the HPI.    Objective:   Physical Exam:    /82   Pulse 76   Temp 98.1 °F (36.7 °C) (Temporal)   Resp 12   Wt 171 lb (77.6 kg)   SpO2 97%   BMI 29.35 kg/m²   General: No acute distress, Alert, anxious- tremulous   Respiratory: No rhonchi, no wheezes  Cardiovascular: S1, S2. Regular rate and rhythm  Abdomen: Soft, Non-tender, non-distended, positive bowel sounds  Neuro: No new focal deficits  Extremities: No edema      Results:    Labs:      Labs Last 24 Hours:    Recent Labs   Lab 07/19/24  1642   RBC 4.48   HGB 14.0   HCT 39.7   MCV 88.6   MCH 31.3   MCHC 35.3   RDW 12.4   NEPRELIM 4.69   WBC 7.8   .0       Recent Labs   Lab 07/19/24  1642   *   BUN 16   CREATSERUM 0.77   EGFRCR 86   CA 9.2   ALB 4.6      K 3.5      CO2 27.0   ALKPHO 70   AST 26   ALT 43   BILT 0.7   TP 7.3       No results found for: \"PT\", \"INR\"    Recent Labs   Lab 07/19/24  1642   TROPHS 3       No results for input(s): \"TROP\", \"PBNP\" in the last 168 hours.    No results for input(s): \"PCT\" in the last 168 hours.    Imaging: Imaging data reviewed in Epic.    Assessment & Plan:      #Dizziness, Transient R sided weakness/numbness and visual changes - 5 days ago,resolved   CT head with small caudate bleed vs stroke ?  Hold off ASA, start statin  Echo  NeuroSx on CS- OBS  Neurology on Cs  PRN for  HTN  PRN xanax pt v anxious        Plan of care discussed with pt, pt spouse, son     Fatmata Batista MD    Supplementary Documentation:     The 21st Century Cures Act makes medical notes like these available to patients in the interest of transparency. Please be advised this is a medical document. Medical documents are intended to carry relevant information, facts as evident, and the clinical opinion of the practitioner. The medical note is  intended as peer to peer communication and may appear blunt or direct. It is written in medical language and may contain abbreviations or verbiage that are unfamiliar.

## 2024-07-20 NOTE — PLAN OF CARE
NURSING ADMISSION NOTE      Patient admitted via Cart  Oriented to room.  Safety precautions initiated.  Bed in low position.  Call light in reach.    Assumed pt care around 1230  A&O x4, Q4 neuros, dizziness with no other noted deficits  RA  Tele: NSR  Continent of bladder and bowel  Dysphagia screening complete, cardiac diet  Denies pain  Up SBA  Echo/PT/OT/SLP/Consults in AM  Pt updated on POC  Call light within reach    Problem: CARDIOVASCULAR - ADULT  Goal: Maintains optimal cardiac output and hemodynamic stability  Description: INTERVENTIONS:  - Monitor vital signs, rhythm, and trends  - Monitor for bleeding, hypotension and signs of decreased cardiac output  - Evaluate effectiveness of vasoactive medications to optimize hemodynamic stability  - Monitor arterial and/or venous puncture sites for bleeding and/or hematoma  - Assess quality of pulses, skin color and temperature  - Assess for signs of decreased coronary artery perfusion - ex. Angina  - Evaluate fluid balance, assess for edema, trend weights  Outcome: Progressing  Goal: Absence of cardiac arrhythmias or at baseline  Description: INTERVENTIONS:  - Continuous cardiac monitoring, monitor vital signs, obtain 12 lead EKG if indicated  - Evaluate effectiveness of antiarrhythmic and heart rate control medications as ordered  - Initiate emergency measures for life threatening arrhythmias  - Monitor electrolytes and administer replacement therapy as ordered  Outcome: Progressing     Problem: RESPIRATORY - ADULT  Goal: Achieves optimal ventilation and oxygenation  Description: INTERVENTIONS:  - Assess for changes in respiratory status  - Assess for changes in mentation and behavior  - Position to facilitate oxygenation and minimize respiratory effort  - Oxygen supplementation based on oxygen saturation or ABGs  - Provide Smoking Cessation handout, if applicable  - Encourage broncho-pulmonary hygiene including cough, deep breathe, Incentive Spirometry  -  Assess the need for suctioning and perform as needed  - Assess and instruct to report SOB or any respiratory difficulty  - Respiratory Therapy support as indicated  - Manage/alleviate anxiety  - Monitor for signs/symptoms of CO2 retention  Outcome: Progressing     Problem: SKIN/TISSUE INTEGRITY - ADULT  Goal: Skin integrity remains intact  Description: INTERVENTIONS  - Assess and document risk factors for pressure ulcer development  - Assess and document skin integrity  - Monitor for areas of redness and/or skin breakdown  - Initiate interventions, skin care algorithm/standards of care as needed  Outcome: Progressing  Goal: Incision(s), wounds(s) or drain site(s) healing without S/S of infection  Description: INTERVENTIONS:  - Assess and document risk factors for pressure ulcer development  - Assess and document skin integrity  - Assess and document dressing/incision, wound bed, drain sites and surrounding tissue  - Implement wound care per orders  - Initiate isolation precautions as appropriate  - Initiate Pressure Ulcer prevention bundle as indicated  Outcome: Progressing  Goal: Oral mucous membranes remain intact  Description: INTERVENTIONS  - Assess oral mucosa and hygiene practices  - Implement preventative oral hygiene regimen  - Implement oral medicated treatments as ordered  Outcome: Progressing     Problem: HEMATOLOGIC - ADULT  Goal: Maintains hematologic stability  Description: INTERVENTIONS  - Assess for signs and symptoms of bleeding or hemorrhage  - Monitor labs and vital signs for trends  - Administer supportive blood products/factors, fluids and medications as ordered and appropriate  - Administer supportive blood products/factors as ordered and appropriate  Outcome: Progressing  Goal: Free from bleeding injury  Description: (Example usage: patient with low platelets)  INTERVENTIONS:  - Avoid intramuscular injections, enemas and rectal medication administration  - Ensure safe mobilization of  patient  - Hold pressure on venipuncture sites to achieve adequate hemostasis  - Assess for signs and symptoms of internal bleeding  - Monitor lab trends  - Patient is to report abnormal signs of bleeding to staff  - Avoid use of toothpicks and dental floss  - Use electric shaver for shaving  - Use soft bristle tooth brush  - Limit straining and forceful nose blowing  Outcome: Progressing     Problem: MUSCULOSKELETAL - ADULT  Goal: Return mobility to safest level of function  Description: INTERVENTIONS:  - Assess patient stability and activity tolerance for standing, transferring and ambulating w/ or w/o assistive devices  - Assist with transfers and ambulation using safe patient handling equipment as needed  - Ensure adequate protection for wounds/incisions during mobilization  - Obtain PT/OT consults as needed  - Advance activity as appropriate  - Communicate ordered activity level and limitations with patient/family  Outcome: Progressing  Goal: Maintain proper alignment of affected body part  Description: INTERVENTIONS:  - Support and protect limb and body alignment per provider's orders  - Instruct and reinforce with patient and family use of appropriate assistive device and precautions (e.g. spinal or hip dislocation precautions)  Outcome: Progressing     Problem: NEUROLOGICAL - ADULT  Goal: Achieves stable or improved neurological status  Description: INTERVENTIONS  - Assess for and report changes in neurological status  - Initiate measures to prevent increased intracranial pressure  - Maintain blood pressure and fluid volume within ordered parameters to optimize cerebral perfusion and minimize risk of hemorrhage  - Monitor temperature, glucose, and sodium. Initiate appropriate interventions as ordered  Outcome: Progressing  Goal: Absence of seizures  Description: INTERVENTIONS  - Monitor for seizure activity  - Administer anti-seizure medications as ordered  - Monitor neurological status  Outcome:  Progressing  Goal: Remains free of injury related to seizure activity  Description: INTERVENTIONS:  - Maintain airway, patient safety  and administer oxygen as ordered  - Monitor patient for seizure activity, document and report duration and description of seizure to MD/LIP  - If seizure occurs, turn patient to side and suction secretions as needed  - Reorient patient post seizure  - Seizure pads on all 4 side rails  - Instruct patient/family to notify RN of any seizure activity  - Instruct patient/family to call for assistance with activity based on assessment  Outcome: Progressing  Goal: Achieves maximal functionality and self care  Description: INTERVENTIONS  - Monitor swallowing and airway patency with patient fatigue and changes in neurological status  - Encourage and assist patient to increase activity and self care with guidance from PT/OT  - Encourage visually impaired, hearing impaired and aphasic patients to use assistive/communication devices  Outcome: Progressing

## 2024-07-20 NOTE — CONSULTS
[facility]   NEUROLOGY   CONSULT NOTE    Admission date: 7/19/2024  Reason for Consult: Right-sided weakness.  Chief Complaint:   Chief Complaint   Patient presents with    Hypertension   ________________________________________________________________    History     History of Presenting Illness  63 year old female presented to the emergency department complaints of right-sided weakness.  Patient was also noted to have significantly elevated blood pressure.  An MRI of the brain was performed which reported small left caudate intracerebral bleed.  Patient currently feels much better.  Denies headache, nausea, vomiting, diplopia, dysarthria, dysphagia.  Also does not feel significantly weak.    History obtained from patient, family members and chart review.    Past Medical History:    Cervicitis and endocervicitis     Past Surgical History:   Procedure Laterality Date    Other surgical history      cervix cryosurgery    Tubal ligation       Social History     Socioeconomic History    Marital status:    Tobacco Use    Smoking status: Never    Smokeless tobacco: Never   Vaping Use    Vaping status: Never Used   Substance and Sexual Activity    Alcohol use: Yes     Alcohol/week: 0.0 standard drinks of alcohol     Comment: 2 times month    Drug use: No   Other Topics Concern    Caffeine Concern Yes     Comment: coffee daily    Exercise Yes   Social History Narrative    ** Merged History Encounter **          Social Determinants of Health     Food Insecurity: No Food Insecurity (7/19/2024)    Food Insecurity     Food Insecurity: Never true   Transportation Needs: No Transportation Needs (7/19/2024)    Transportation Needs     Lack of Transportation: No   Housing Stability: Low Risk  (7/19/2024)    Housing Stability     Housing Instability: No     Family History   Problem Relation Age of Onset    High Cholesterol Father     Hypertension Father     Breast Cancer Paternal Grandmother 60        In  her 60's.    Hypertension Mother     Thyroid Disorder Mother     High Cholesterol Mother     Other (colitis) Mother     High Cholesterol Sister     Hypertension Sister     High Cholesterol Brother     Hypertension Brother      Allergies No Known Allergies    Home Meds  Current Outpatient Medications   Medication Instructions    amLODIPine (NORVASC) 5 mg, Oral, Daily    atorvastatin (LIPITOR) 40 mg, Oral, Nightly     Scheduled Meds:   amLODIPine  5 mg Oral Daily    atorvastatin  40 mg Oral Nightly     Continuous Infusions:   sodium chloride 75 mL/hr at 07/20/24 1154     PRN Meds:  ALPRAZolam    labetalol    hydrALAzine    ondansetron    metoclopramide    acetaminophen    polyethylene glycol (PEG 3350)    sennosides    bisacodyl    fleet enema    benzonatate    glycerin-hypromellose-    sodium chloride    hydrALAzine    guaiFENesin    OBJECTIVE   VITAL SIGNS:   Temp:  [97.6 °F (36.4 °C)-98.1 °F (36.7 °C)] 97.6 °F (36.4 °C)  Pulse:  [64-91] 83  Resp:  [11-22] 20  BP: (117-186)/(61-95) 136/72  SpO2:  [93 %-100 %] 96 %    PHYSICAL EXAM:    NEUROLOGIC:    Mental Status:  A&O x 4, Follows simple commands, no obvious aphasia or dysarthria  Cranial nerves: PERRL.  Visual fields full.  EOMI.  Face symmetric with normal movement bilaterally.  Hearing grossly intact. Tongue midline with normal movements.   Motor: Drift:  Absent; Motor exam is 5 out of 5 in all extremities bilaterally  Sensation: Intact to light touch bilaterally  Cerebellar: Normal Finger-To-Nose test        LABORATORY DATA:  Last 24 hour labs were reviewed in detail.  Recent Labs   Lab 07/19/24  1642      K 3.5      CO2 27.0   *   BUN 16   CREATSERUM 0.77     Recent Labs   Lab 07/19/24  1642   WBC 7.8   HGB 14.0   .0     Recent Labs   Lab 07/19/24  1642   ALT 43   AST 26     No results for input(s): \"MG\", \"PHOS\" in the last 168 hours.  Last A1c value was 5.2% done 7/20/2024.     Lab Results   Component Value Date      07/19/2024    HDL 39 07/19/2024    TRIG 288 07/19/2024    VLDL 53 07/19/2024        Radiology:    CTA BRAIN + CTA CAROTIDS (CPT=70496/81356)    Addendum Date: 7/20/2024    ADDENDUM:  Within the V3/V4 segment of the right vertebral artery there is some irregularity undulation of the lumen of the vessel wall for short segment which may represent a short segment of fibromuscular dysplasia.  Dictated by (CST): Koby Culver MD on 7/20/2024 at 11:18 AM     Finalized by (CST): Koby Culver MD on 7/20/2024 at 11:20 AM             Result Date: 7/20/2024  CONCLUSION:   1. No acute intracranial process.   2. The left caudate head lesion may represent a subacute to chronic infarct or hemorrhage.  3. Unremarkable CTA of the [id number].  4. Unremarkable CT a of the neck LOCATION:  [facility]   Dictated by (CST): Koby Culver MD on 7/19/2024 at 9:29 PM     Finalized by (CST): Koby Culver MD on 7/19/2024 at 9:36 PM       MRI BRAIN (CPT=70551)    Result Date: 7/19/2024  CONCLUSION:   Acute to subacute small hemorrhage involving the left caudate head.  Findings discussed with Dr. Patton at 2018 hours on 7/19/2024 with read back.  LOCATION:  [facility]   Dictated by (CST): Koby Culver MD on 7/19/2024 at 8:11 PM     Finalized by (CST): Koby Culver MD on 7/19/2024 at 8:18 PM      ASSESSMENT/PLAN   63 year old female with:    Acute/subacute left caudate intracerebral bleed with mild right hemiparetic symptoms.  Clinical symptoms resolved.  CTA of the head and neck was unremarkable.  Advise close blood pressure monitoring with systolic blood pressure parameters of 100-150.  Continue neurochecks.  Neurosurgery's recommendation reviewed.  Newly diagnosed hypertension.  Patient started on amlodipine.  Blood pressure parameters as mentioned above.  Hyperlipidemia with LDL of 128.  Advise atorvastatin 40 mg daily.  OT/PT/rehab eval.  Patient and family members counseled.    Principal Problem:    Hypertension, unspecified type  Active  Problems:    Hemorrhage in caudate nucleus (HCC)       Laci Ontiveros MD  Neurohospitalist  [facility]    Disclaimer: This record was dictated using Dragon software. There may be errors due to voice recognition problems that were not realized and corrected during the completion of the note.

## 2024-07-20 NOTE — CONSULTS
[facility] Neurosurgery Consult    Herlinda Hernandez Patient Status:  Inpatient    10/24/1960 MRN [id number]   [facility] 7NE-A Attending Becky Rojas, DO   Hosp Day # 1 PCP Jose De Leon MD     REASON FOR CONSULTATION: L caudate hemorrhage, Right sided weakness     HISTORY OF PRESENT ILLNESS:  Herlinda Hernandez is a(n) 63 year old female with a pertinent PMH of HTN and hypercholesterolemia    The patient presented to the ED for HTN. Patient with right sided weakness, light headedness and poor balance beginning Monday. Was unable to hold a glass of water. Also endorsed some wavy lines in her vision of the right eye. MRI brain was ordered, acute to subacute small hemorrhage involved the left caudate head noted. CTA head and neck was completed noting, the left caudate head lesion may represent a subacute to chronic infarct or hemorrhage. NSGY was consulted.     Patient endorses prior to this, , she had a severe headache. She stayed home from work.       PAST MEDICAL HISTORY:  Past Medical History:    Cervicitis and endocervicitis       PAST SURGICAL HISTORY:  Past Surgical History:   Procedure Laterality Date    Other surgical history      cervix cryosurgery    Tubal ligation         FAMILY HISTORY:  family history includes Breast Cancer (age of onset: 60) in her paternal grandmother; High Cholesterol in her brother, father, mother, and sister; Hypertension in her brother, father, mother, and sister; Thyroid Disorder in her mother; colitis in her mother.    SOCIAL HISTORY:   reports that she has never smoked. She has never used smokeless tobacco. She reports current alcohol use. She reports that she does not use drugs.    ALLERGIES:  No Known Allergies    MEDICATIONS:  Medications Prior to Admission   Medication Sig Dispense Refill Last Dose    [] benzonatate 100 MG Oral Cap Take 1 capsule (100 mg total) by mouth 3 (three) times daily as needed for cough. 30 capsule 0 Past Week     Current  Facility-Administered Medications   Medication Dose Route Frequency    amLODIPine (Norvasc) tab 5 mg  5 mg Oral Daily    ALPRAZolam (Xanax) tab 0.25 mg  0.25 mg Oral Nightly PRN    sodium chloride 0.9% infusion   Intravenous Continuous    labetalol (Trandate) 5 mg/mL injection 10 mg  10 mg Intravenous Q10 Min PRN    hydrALAzine (Apresoline) 20 mg/mL injection 10 mg  10 mg Intravenous Q2H PRN    ondansetron (Zofran) 4 MG/2ML injection 4 mg  4 mg Intravenous Q6H PRN    metoclopramide (Reglan) 5 mg/mL injection 10 mg  10 mg Intravenous Q8H PRN    acetaminophen (Tylenol Extra Strength) tab 1,000 mg  1,000 mg Oral Q8H PRN    atorvastatin (Lipitor) tab 40 mg  40 mg Oral Nightly    polyethylene glycol (PEG 3350) (Miralax) 17 g oral packet 17 g  17 g Oral Daily PRN    sennosides (Senokot) tab 17.2 mg  17.2 mg Oral Nightly PRN    bisacodyl (Dulcolax) 10 MG rectal suppository 10 mg  10 mg Rectal Daily PRN    fleet enema (Fleet) 7-19 GM/118ML rectal enema 133 mL  1 enema Rectal Once PRN    benzonatate (Tessalon) cap 200 mg  200 mg Oral TID PRN    glycerin-hypromellose- (Artificial Tears) 0.2-0.2-1 % ophthalmic solution 1 drop  1 drop Both Eyes QID PRN    sodium chloride (Saline Mist) 0.65 % nasal solution 1 spray  1 spray Each Nare Q3H PRN    hydrALAzine (Apresoline) 20 mg/mL injection 5 mg  5 mg Intravenous Q6H PRN    guaiFENesin (Robitussin) 100 MG/5 ML oral liquid 200 mg  200 mg Oral Q6H PRN       REVIEW OF SYSTEMS:  Comprehensive Review of Systems obtained, and is negative other than that mentioned in the History of Present Illness.      PHYSICAL EXAMINATION:  VITAL SIGNS: /69 (BP Location: Right arm)   Pulse 81   Temp 97.7 °F (36.5 °C) (Oral)   Resp 16   Wt 171 lb (77.6 kg)   SpO2 96%   BMI 29.35 kg/m²   GENERAL:  Pleasant patient is a 63 year old female in no acute distress. Laying comfortably in bed.   HEENT:  Normocephalic, atraumatic  RESP: Easy and even   NEUROLOGICAL:  This patient is alert and  orientated x 3.  Speech fluent. Comprehension intact.   Answer questions appropriately.     Moving BUE and BLE spontaneously to full resistance.     DIAGNOSTIC DATA:   Lab Results   Component Value Date    WBC 7.8 07/19/2024    HGB 14.0 07/19/2024    HCT 39.7 07/19/2024    .0 07/19/2024    CREATSERUM 0.77 07/19/2024    BUN 16 07/19/2024     07/19/2024    K 3.5 07/19/2024     07/19/2024    CO2 27.0 07/19/2024     07/19/2024    CA 9.2 07/19/2024    ALB 4.6 07/19/2024    ALKPHO 70 07/19/2024    BILT 0.7 07/19/2024    TP 7.3 07/19/2024    AST 26 07/19/2024    ALT 43 07/19/2024    INR 1.06 07/20/2024    PTP 13.9 07/20/2024       IMAGING:  Study Result    Narrative   PROCEDURE:  CTA BRAIN + CTA CAROTIDS (CPT=70496/58271)     COMPARISON:  [facility] , MR, MRI BRAIN (CPT=70551), 7/19/2024, 7:04 PM.     INDICATIONS:  179/96 BP in MD office, c/o headache,     TECHNIQUE:  CT angiography of the head and neck were obtained with non-ionic contrast.  3D volume rendering images were obtained by the technologist as well as the radiologist on an independent workstation.  Multiplanar 3D reformatted imaging including  multiplanar MIP images were obtained.  Curved planar reformats were performed through the carotid and vertebral arteries. All measurements obtained in this exam were performed using NASCET criteria.  Dose reduction techniques were used. Dose information  is transmitted to the ACR (American College of Radiology) NRDR (National Radiology Data Registry) which includes the Dose Index Registry.     PATIENT STATED HISTORY:(As transcribed by Technologist)  Headache. Hx of HTn      CONTRAST USED:  75cc of Isovue 370     FINDINGS:    VASCULATURE:  No significant stenosis.  No visible aneurysm or vascular malformation.  Fetal P1 segment on the right with the right posterior cerebral artery supplied by the PCOM.  VENTRICLES:  Normal for age.  No enlargement or displacement.  CEREBRUM:  Low-attenuation lesion  within the left caudate head measuring 8 x 7 mm likely represents an area of old infarction or hemorrhage.  There is low-attenuation within the left centrum semiovale consistent small vessel ischemic disease.  There is  no evidence of acute hemorrhage by CT.  There is no mass lesion or mass effect.  There is no evidence of abnormal enhancement.  CEREBELLUM:  Normal for age.  No excessive atrophy, mass, or hemorrhage, or abnormal enhancement.  BRAINSTEM:  Normal for age.  No excessive atrophy, mass, or hemorrhage, or abnormal enhancement.  BASAL CISTERNS:  No subarachnoid hemorrhage or effacement.    SKULL:  Negative.       LEFT  INTERNAL CAROTID:  No hemodynamically significant stenosis or dissection.  EXTERNAL CAROTID:  No hemodynamically significant stenosis or dissection  COMMON CAROTID:  No hemodynamically significant stenosis or dissection  VERTEBRAL:  No hemodynamically significant stenosis or dissection     RIGHT  INTERNAL CAROTID:   No hemodynamically significant stenosis or dissection  EXTERNAL CAROTID:    No hemodynamically significant stenosis or dissection  COMMON CAROTID:   No hemodynamically significant stenosis or dissection  VERTEBRAL:   No hemodynamically significant stenosis or dissection     OTHER:  The visualized soft tissues of the neck are also unremarkable.                        Impression   CONCLUSION:       1. No acute intracranial process.       2. The left caudate head lesion may represent a subacute to chronic infarct or hemorrhage.     3. Unremarkable CTA of the [id number]-[id number]-Rwbjyc.     4. Unremarkable CT a of the neck  LOCATION:  [facility]        Dictated by (CST): Koby Culver MD on 7/19/2024 at 9:29 PM      Finalized by (CST): Koby Culver MD on 7/19/2024 at 9:36 PM         Study Result    Narrative   PROCEDURE:  MRI BRAIN (CPT=70551)     COMPARISON:  None.     INDICATIONS:  179/96 BP in MD office, c/o headache,     TECHNIQUE:  MRI of the brain was performed with multi-planar T1,  T2-weighted images with FLAIR sequences and diffusion weighted images without infusion.     PATIENT STATED HISTORY: (As transcribed by Technologist)  Patient states having headaches         FINDINGS:    INTRACRANIAL:  Foci of T2/FLAIR hyperintensity with T1 mild hyperintensity noted within the left caudate head measuring 7 x 5 mm consistent with a subacute hemorrhage.  There is mild susceptibility signal on SWI within the lesion suggesting that the  hemorrhages a few days old.  There is also an old lacunar infarct within the left centrum semiovale.  Diffusion weighted imaging was performed and is unremarkable.  There is no evidence for acute infarction.  There is no evidence of hemorrhage or mass  lesion.   VENTRICLES/SULCI:   Ventricles and sulci are normal in caliber.  There are no extra-axial fluid collections. There is no midline shift.  SINUSES/ORBITS:       The visualized paranasal sinuses are clear.  The orbits are unremarkable.  MASTOIDS:      The mastoids are clear.                   Impression   CONCLUSION:       Acute to subacute small hemorrhage involving the left caudate head.  Findings discussed with Dr. Patton at 2018 hours on 7/19/2024 with read back.     LOCATION:  [facility]        Dictated by (CST): Koby Culver MD on 7/19/2024 at 8:11 PM      Finalized by (CST): Koby Culver MD on 7/19/2024 at 8:18 PM       ASSESSMENT:  Patient Active Problem List   Diagnosis    Unspecified hypothyroidism    Hypercholesterolemia    Hypertension, unspecified type    Hemorrhage in caudate nucleus (HCC)     62 y/o female who presents to the ED with HTN. Endorsed this past Monday light headedness, poor balance and right sided weakness. MRI brain and CTA head and neck performed. CTA with left caudate head lesion may represent a subacute to chronic infarct or hemorrhage     Plan:  No acute neurosurgical intervention recommended at this time  Stroke management per neurology  Medical management per hospitalist  No need  for NSGY follow up in the outpatient setting. Continue outpatient follow up per neurology.   Discussed with Dr. Zuluaga    The patient was seen and examined with Dr. Zuluaga. I am acting as scribe. The patient agreed to the plan, verbalized understanding and was appreciative.       Rosalind Churchill M.S., PA-C  [address] [address]  [phone]  7/20/2024 9:16 AM    Dragon speech recognition software was used to prepare this note. If a word or phrase is confusing, it is likely due to a failure of recognition. Please contact me with any questions or clarifications.    Is this a shared or split note between Advanced Practice Provider and Physician? Yes

## 2024-07-21 ENCOUNTER — APPOINTMENT (OUTPATIENT)
Dept: CV DIAGNOSTICS | Facility: HOSPITAL | Age: 64
End: 2024-07-21
Attending: STUDENT IN AN ORGANIZED HEALTH CARE EDUCATION/TRAINING PROGRAM
Payer: COMMERCIAL

## 2024-07-21 VITALS
DIASTOLIC BLOOD PRESSURE: 96 MMHG | HEART RATE: 74 BPM | SYSTOLIC BLOOD PRESSURE: 149 MMHG | BODY MASS INDEX: 29 KG/M2 | WEIGHT: 171 LBS | RESPIRATION RATE: 15 BRPM | TEMPERATURE: 98 F | OXYGEN SATURATION: 95 %

## 2024-07-21 LAB
GLUCOSE BLD-MCNC: 96 MG/DL (ref 70–99)
GLUCOSE BLD-MCNC: 99 MG/DL (ref 70–99)

## 2024-07-21 PROCEDURE — 93306 TTE W/DOPPLER COMPLETE: CPT | Performed by: STUDENT IN AN ORGANIZED HEALTH CARE EDUCATION/TRAINING PROGRAM

## 2024-07-21 PROCEDURE — 99233 SBSQ HOSP IP/OBS HIGH 50: CPT | Performed by: OTHER

## 2024-07-21 PROCEDURE — 99239 HOSP IP/OBS DSCHRG MGMT >30: CPT | Performed by: HOSPITALIST

## 2024-07-21 RX ORDER — AMLODIPINE BESYLATE 5 MG/1
5 TABLET ORAL ONCE
Status: COMPLETED | OUTPATIENT
Start: 2024-07-21 | End: 2024-07-21

## 2024-07-21 RX ORDER — AMLODIPINE BESYLATE 10 MG/1
10 TABLET ORAL DAILY
Qty: 90 TABLET | Refills: 1 | Status: SHIPPED | OUTPATIENT
Start: 2024-07-21

## 2024-07-21 NOTE — DISCHARGE SUMMARY
[facility]     Herlinda Hernandez Patient Status:  Inpatient    10/24/1960 MRN [id number]   Location [facility] 7NE-A Attending No att. providers found   Hosp Day # 2 PCP Jose De Leon MD     Date of Admission: 2024  Date of Discharge:  2024     Discharge Disposition: Home or Self Care    Discharge Diagnosis:  #Acute dizziness, resolved  #Acute to subacute small hemorrhage involving the left body head  -Neurosurgery and neurology following  -Therapies to see  -Add low-dose amlodipine     #Hyperlipidemia  -Statin    #HTN (new)    History of Present Illness: Herlinda Hernandez is a 63 year old female who this past Monday she reports feeling dizzy after coming inside the house from being outside. She felt her right side weak as well, her daughter handed her a cup of water which she dropped. Pt also notes visual changes in her right eye- transient looked like an aura of gentile she says.no speech changes, no headache, no n, no v. No falls/ no head trauma.  Dtr has been checking the pt BP range 160 SBP, 100 DBP pt is usually low/ normal and not on any meds          Brief Synopsis: pt admitted for dizziness. Found [address] small intracranl hemorrhage. Sx improved. Seen by neuro sx, neuro. Pt essentially has no sx now. Pt medically cleared for dc. Pt started on amlodipine for HTN    Lace+ Score: 45  59-90 High Risk  29-58 Medium Risk  0-28   Low Risk       TCM Follow-Up Recommendation:  LACE 29-58: Moderate Risk of readmission after discharge from the hospital.      Procedures during hospitalization:       Incidental or significant findings and recommendations (brief descriptions):      Lab/Test results pending at Discharge:       Consultants:  Neuro sx and neurology    Discharge Medication List:     Discharge Medications        START taking these medications        Instructions Prescription details   amLODIPine 10 MG Tabs  Commonly known as: Norvasc      Take 1 tablet (10  mg total) by mouth daily.   Quantity: 90 tablet  Refills: 1     atorvastatin 40 MG Tabs  Commonly known as: Lipitor      Take 1 tablet (40 mg total) by mouth nightly.   Quantity: 90 tablet  Refills: 1            STOP taking these medications      benzonatate 100 MG Caps  Commonly known as: Tessalon                  Where to Get Your Medications        These medications were sent to [id number] - [place], [place] - [phone], [phone]  [address] [facility], [facility] [address]      Phone: [phone]   amLODIPine 10 MG Tabs  atorvastatin 40 MG Tabs         [place] reviewed:     Follow-up appointment:   Jose De Leon MD  [address] [place] [address]  [address]  [facility] [address] [phone]    Follow up on 7/22/2024      Laci Ontiveros MD  [address]  [facility] [address]  [facility] [address] [phone]    Call in 1 week(s)      Appointments for Next 30 Days 7/21/2024 - 8/20/2024        Date Arrival Time Visit Type Length Department Provider     7/31/2024  8:00 AM  MYCHART EXAM [7834] 30 min [facility], [address] [place] [address] oJse De Leon MD    Patient Instructions:     Please arrive 15 minutes prior to your scheduled appointment. Please also bring your Insurance card, Photo ID, and your medication bottles or a list of your current medication.    If your condition improves and this appointment is no longer needed, please contact your physician office to cancel.    Please verify with your primary care provider if your insurance requires a referral.        Location Instructions:     Masks are optional for all patients and visitors, unless otherwise indicated.                      Vital signs:  Temp:  [97.7 °F (36.5 °C)-98.3 °F (36.8 °C)] 98.3 °F (36.8 °C)  Pulse:  [] 74  Resp:  [15-25] 15  BP: (121-159)/(72-96) 149/96  SpO2:  [93 %-98 %] 95 %    Physical Exam:    General: No acute distress   Lungs: clear to auscultation  Cardiovascular: S1, S2  Abdomen:  Soft      -----------------------------------------------------------------------------------------------  PATIENT DISCHARGE INSTRUCTIONS: See electronic chart    Becky Rojas,     Total time spent on discharge plannin minutes     The  [employer] Cures Act makes medical notes like these available to patients in the interest of transparency. Please be advised this is a medical document. Medical documents are intended to carry relevant information, facts as evident, and the clinical opinion of the practitioner. The medical note is intended as peer to peer communication and may appear blunt or direct. It is written in medical language and may contain abbreviations or verbiage that are unfamiliar.

## 2024-07-21 NOTE — DISCHARGE PLANNING
Patient follow-up appointment information:     Visit Type: Stroke Follow-up  Date of TIA/Stroke: 7/19/2024  Type of Stroke: Hemorrhagic  Patient to follow-up: 3 months  OP Neurologist: Any available neurologist  New patient to neurology service: Yes  Anticipated discharge (if known): 7/21  Discharge disposition (if known): Home      RN Stroke Navigator team  [phone]

## 2024-07-21 NOTE — SLP NOTE
ADULT SWALLOWING EVALUATION    ASSESSMENT    ASSESSMENT/OVERALL IMPRESSION:  Pt is a 62 y/o female admitted with dizziness (resolved) and diagnosed with subacute small hemorrhage. PMHx HL.Order received for BSSE to r/o aspiration. Pt currently resides at home with her spouse and daughter. Pt denied any current communication difficulties. Pt denied dysphagia symptoms including, but not limited to odynophagia, globus sensation, reflux, recent PNA, unintentional weight loss. Pt consumes a regular diet and thin liquids at baseline.     Pt presented with an intact oropharyngeal swallow function. Bolus acceptance was intact w/o evidence of anterior bolus loss. Mastication and AP transit were thorough and efficient w/o evidence of oral residue. Hyolaryngeal excursion was present per palpation. No overt s/s of aspiration were observed.    Recommend pt consume a regular diet and thin liquids. Aspiration precautions recommended (e.g. small bites and sips, slow rate, upright position) to prevent any potential pulmonary complications. Education was provided re: results and recommendations. SLP will follow up to monitor diet tolerance and administer communication evaluation.                RECOMMENDATIONS   Diet Recommendations - Solids: Regular  Diet Recommendations - Liquids: Thin Liquids                        Compensatory Strategies Recommended: Slow rate;Small bites and sips  Aspiration Precautions: Slow rate;Upright position;Small bites and sips  Medication Administration Recommendations: One pill at a time  Treatment Plan/Recommendations: Communication evaluation;Aspiration precautions    HISTORY   MEDICAL HISTORY  Reason for Referral: R/O aspiration    Problem List  Principal Problem:    Hypertension, unspecified type  Active Problems:    Hemorrhage in caudate nucleus (HCC)      Past Medical History  Past Medical History:    Cervicitis and endocervicitis       Prior Living Situation: Home with spouse  Diet Prior to  Admission: Regular;Thin liquids  Precautions: Aspiration    Patient/Family Goals: n/a    SWALLOWING HISTORY  Current Diet Consistency: Regular;Thin liquids  Dysphagia History: see above  Imaging Results:   CONCLUSION:       Acute to subacute small hemorrhage involving the left caudate head.  Findings discussed with Dr. Patton at 2018 hours on 7/19/2024 with read back.      LOCATION:  [facility]         Dictated by (CST): Koby Culver MD on 7/19/2024 at 8:11 PM       Finalized by (CST): Koby Culver MD on 7/19/2024 at 8:18 PM   SUBJECTIVE       OBJECTIVE   ORAL MOTOR EXAMINATION  Dentition: Natural  Symmetry: Within Functional Limits  Strength: Within Functional Limits  Tone: Within Functional Limits  Range of Motion: Within Functional Limits  Rate of Motion: Within Functional Limits    Voice Quality: Clear  Respiratory Status: Unlabored  Consistencies Trialed: Thin liquids;Puree;Soft solid;Hard solid  Method of Presentation: Self presentation  Patient Positioning: Upright;Midline    Oral Phase of Swallow: Within Functional Limits                      Pharyngeal Phase of Swallow: Within Functional Limits           (Please note: Silent aspiration cannot be evaluated clinically. Videofluoroscopic Swallow Study is required to rule-out silent aspiration.)    Esophageal Phase of Swallow: No complaints consistent with possible esophageal involvement  Comments: none              GOALS  Goal #1 The patient will tolerate regular consistency and thin liquids without overt signs or symptoms of aspiration with 90 % accuracy over 1-2 session(s).  In Progress   Goal #2 The patient/family/caregiver will demonstrate understanding and implementation of aspiration precautions and swallow strategies independently over 1-2 session(s).    In Progress   Goal #3 Pt will complete communication evaluation.   In Progress   FOLLOW UP  Treatment Plan/Recommendations: Communication evaluation;Aspiration precautions  Number of Visits to Meet  Established Goals:  (1-2)  Follow Up Needed (Documentation Required): Yes  SLP Follow-up Date: 07/23/24    Thank you for your referral.   If you have any questions, please contact NURY Villalpando

## 2024-07-21 NOTE — PROGRESS NOTES
History of Presenting Illness:  Seen for follow-up visit.  Denies headache, nausea, vomiting, new weakness, numbness, paresthesias, confusion.  Blood pressure mostly reasonably controlled.      Vitals:   Vitals:    07/21/24 1200   BP: (!) 149/96   Pulse: 74   Resp: 15   Temp: 98.3 °F (36.8 °C)      Examination:    Awake , alert, non-dysarthric, expressive an receptive language normal.   Pupils round and reactive to light, extra ocular movement normal, no facial weakness, hearing normal.  Motor strength and reflexes symmetrical.  Finger to Nose testing normal.     Investigations:    CTA BRAIN + CTA CAROTIDS (CPT=70496/52520)    Addendum Date: 7/20/2024    ADDENDUM:  Within the V3/V4 segment of the right vertebral artery there is some irregularity undulation of the lumen of the vessel wall for short segment which may represent a short segment of fibromuscular dysplasia.  Dictated by (CST): Koby Culver MD on 7/20/2024 at 11:18 AM     Finalized by (CST): Koby Culver MD on 7/20/2024 at 11:20 AM             Result Date: 7/20/2024  CONCLUSION:   1. No acute intracranial process.   2. The left caudate head lesion may represent a subacute to chronic infarct or hemorrhage.  3. Unremarkable CTA of the myjksb-ui-Sucldr.  4. Unremarkable CT a of the neck LOCATION:  [facility]   Dictated by (CST): Koby Culver MD on 7/19/2024 at 9:29 PM     Finalized by (CST): Koby Culver MD on 7/19/2024 at 9:36 PM       MRI BRAIN (CPT=70551)    Result Date: 7/19/2024  CONCLUSION:   Acute to subacute small hemorrhage involving the left caudate head.  Findings discussed with Dr. Patton at 2018 hours on 7/19/2024 with read back.  LOCATION:  [facility]   Dictated by (CST): Koby Culver MD on 7/19/2024 at 8:11 PM     Finalized by (CST): Koby Culver MD on 7/19/2024 at 8:18 PM            Lab Results   Component Value Date    A1C 5.2 07/20/2024        Lab Results   Component Value Date     (H) 07/19/2024    HDL 39 (L) 07/19/2024    TRIG 288  (H) 07/19/2024        Recent Labs   Lab 07/19/24  1642   RBC 4.48   HGB 14.0   HCT 39.7   MCV 88.6   MCH 31.3   MCHC 35.3   RDW 12.4   NEPRELIM 4.69   WBC 7.8   .0       Recent Labs   Lab 07/19/24  1642   *   BUN 16   CREATSERUM 0.77   EGFRCR 86   CA 9.2      K 3.5      CO2 27.0       Impression/MDM.    Acute/subacute left caudate intracerebral bleed with mild right hemiparetic symptoms.  Clinical symptoms resolved.  CTA of the head and neck was unremarkable.  Advise close blood pressure monitoring with systolic blood pressure parameters of 100-150.  Continue neurochecks.  Neurosurgery's recommendation reviewed.  Newly diagnosed hypertension.  Patient started on amlodipine.  Blood pressure parameters as mentioned above.  Mostly controlled in the last 24 hours.  Hyperlipidemia with LDL of 128.  Advise atorvastatin 40 mg daily.  OT/PT/rehab eval.  Patient and family members counseled.  Patient to follow-up with neurosurgery as per the recommendations.      Time spent 35 minutes.  Greater than 50% time spent counseling patient and multiple family members.

## 2024-07-21 NOTE — PLAN OF CARE
Stroke booklet given to patient; the following education was provided:     What is stroke  BEFAST - Stroke warning sings and symptoms  How to initiate EMS  Secondary stroke prevention and personalized risk factors: HTN  Lifestyle modifications (nutrition and exercise)  Post-stroke recovery and follow-up  Community resources (stroke support group and non-emergent stroke line)    Patient's family also present during education, they are all receptive to teachings. All pertinent questions and concerns were addressed.      RN Stroke Navigator team  [phone]

## 2024-07-21 NOTE — PLAN OF CARE
Assumed care of pt at 1930.pt denies pain at this time. Neuro checks intact no deficits noted.  Pt awaiting echo in am. Remains on ivf. On room air. Lungs clear. All questions and concerns addressed. Bp remains within ordered range.

## 2024-07-21 NOTE — PLAN OF CARE
Assumed care at 0730  A&Ox4, VSS, up with standby assist   IVF infusing per order   Tolerating diet   No change in neuro status   SLP eval complete   Echo complete   Medications, prescriptions and AVS reviewed with patient and family- verbalized understanding   All questions answered       NURSING DISCHARGE NOTE    Discharged Home via Wheelchair.  Accompanied by Support staff  Belongings Taken by patient/family.

## 2024-07-22 ENCOUNTER — PATIENT OUTREACH (OUTPATIENT)
Dept: CASE MANAGEMENT | Age: 64
End: 2024-07-22

## 2024-07-22 LAB
ATRIAL RATE: 70 BPM
P AXIS: 3 DEGREES
P-R INTERVAL: 148 MS
Q-T INTERVAL: 414 MS
QRS DURATION: 80 MS
QTC CALCULATION (BEZET): 447 MS
R AXIS: -6 DEGREES
T AXIS: 1 DEGREES
VENTRICULAR RATE: 70 BPM

## 2024-07-22 NOTE — PAYOR COMM NOTE
--------------  DISCHARGE REVIEW    Payor: [facility] PPO  Subscriber #:  [id number]  Authorization Number: [id number]    Admit date: 24  Admit time:  10:34 PM  Discharge Date: 2024  3:41 PM     Admitting Physician: Yasmany Rizo MD  Attending Physician:  No att. providers found  Primary Care Physician: Jose De Leon MD    Discharge Summary signed by Becky Rojas DO at 2024  4:54 PM      [facility]      10/24/1960 MRN [id number]   Location [facility] 7NE-A Attending No att. providers found   Hosp Day # 2 PCP Jose De Leon MD     Date of Admission: 2024  Date of Discharge:  2024     Discharge Disposition: Home or Self Care    Discharge Diagnosis:  #Acute dizziness, resolved  #Acute to subacute small hemorrhage involving the left body head  -Neurosurgery and neurology following  -Therapies to see  -Add low-dose amlodipine     #Hyperlipidemia  -Statin    #HTN (new)    History of Present Illness:  63 year old female who this past Monday she reports feeling dizzy after coming inside the house from being outside. She felt her right side weak as well, her daughter handed her a cup of water which she dropped. Pt also notes visual changes in her right eye- transient looked like an aura of gentile she says.no speech changes, no headache, no n, no v. No falls/ no head trauma.  Dtr has been checking the pt BP range 160 SBP, 100 DBP pt is usually low/ normal and not on any meds     Brief Synopsis: pt admitted for dizziness. Found to have small intracranl hemorrhage. Sx improved. Seen by neuro sx, neuro. Pt essentially has no sx now. Pt medically cleared for dc. Pt started on amlodipine for HTN    Lace+ Score: 45  59-90 High Risk  29-58 Medium Risk  0-28   Low Risk     TCM Follow-Up Recommendation:  LACE 29-58: Moderate Risk of readmission after discharge from the hospital.    Consultants:  Neuro sx and neurology    Discharge Medication List:  START taking these medications         Instructions Prescription details   amLODIPine 10 MG Tabs  Commonly known as: Norvasc      Take 1 tablet (10 mg total) by mouth daily.   Quantity: 90 tablet  Refills: 1     atorvastatin 40 MG Tabs  Commonly known as: Lipitor      Take 1 tablet (40 mg total) by mouth nightly.   Quantity: 90 tablet  Refills: 1            STOP taking these medications      benzonatate 100 MG Caps  Commonly known as: Tessalon          [place]   Follow-up appointment:   Jose De Leon MD  [address] [place] [address]  [address]  [facility] [address]  [phone]    Follow up on 7/22/2024    Laci Ontiveros MD  [address]  [facility] [address]  [phone]    Call in 1 week(s)    Appointments for Next 30 Days 7/21/2024 - 8/20/2024        Date Arrival Time Visit Type Length Department Provider     7/31/2024  8:00 AM  MYCHART EXAM [2964] 30 min [facility], [address] [place] [address] Jose De Leon MD    Patient Instructions:     Please arrive 15 minutes prior to your scheduled appointment. Please also bring your Insurance card, Photo ID, and your medication bottles or a list of your current medication.    If your condition improves and this appointment is no longer needed, please contact your physician office to cancel.    Please verify with your primary care provider if your insurance requires a referral.        Location Instructions:     Masks are optional for all patients and visitors, unless otherwise indicated.               Vital signs:  Temp:  [97.7 °F (36.5 °C)-98.3 °F (36.8 °C)] 98.3 °F (36.8 °C)  Pulse:  [] 74  Resp:  [15-25] 15  BP: (121-159)/(72-96) 149/96  SpO2:  [93 %-98 %] 95 %    Physical Exam:    General: No acute distress   Lungs: clear to auscultation  Cardiovascular: S1, S2  Abdomen: Soft    Becky Rojas DO  7/21/2024  4:54 PM      REVIEWER COMMENTS    FOR FINAL REVIEW/APPROVAL OF ALL INPT DAYS

## 2024-07-22 NOTE — PAYOR COMM NOTE
--------------  ADMISSION REVIEW     Payor: [facility]  Subscriber #:  [id number]  Authorization Number: [id number]    Admit date: 7/19/24  Admit time: 10:34 PM       REVIEW DOCUMENTATION:  ED Provider Notes signed by Nimisha Patton DO at 7/19/2024  9:39 PM      Patient Seen in: [facility] Emergency Department    History     Chief Complaint   Patient presents with    Hypertension     Stated Complaint: 179/96 BP in MD office, c/o headache,    HPI  This is a right-handed 63-year-old female no significant past medical history who presents with hypertension.  Patient reports on Monday afternoon she was not feeling good she felt lightheaded and felt off balance.  She felt like her right arm and the right leg were weak.  Her daughter gave her a glass of water and she dropped it because she could not hang onto it.  She also states she has some wavy lines in her vision in her right eye.  Speech was clear.  She denied any headache.  Her blood pressure was elevated 160/100 at that time.  The symptoms lasted about 20 minutes and went away.  She called her doctor the next day but cannot get in till July 31.  She is been monitoring her blood pressure throughout the week and it has been running over the 160s over 90s.  Today she felt weak and fatigued and was concerned and thought she should come and get checked out because she is due to go on a camping trip.  Currently she denies any symptoms.  No chest pain or shortness of breath.  No abdominal pain.  She states she is never been told she had hypertension she is never been on medications.  Hypertension does run in her family.  She does not smoke tobacco.  She drinks alcohol socially.  She presents here with her family for further evaluation.    Past Medical History:    Cervicitis and endocervicitis     Past Surgical History:   Procedure Laterality Date    Other surgical history      cervix cryosurgery    Tubal ligation       Review of Systems  Positive for stated Chief  Complaint: Hypertension  Other systems are as noted in HPI.  Constitutional and vital signs reviewed.    All other systems reviewed and negative except as noted above.    Physical Exam     ED Triage Vitals   BP 07/19/24 1628 151/89   Pulse 07/19/24 1628 85   Resp 07/19/24 1628 18   Temp 07/19/24 1628 98.1 °F (36.7 °C)   Temp src 07/19/24 1628 Temporal   SpO2 07/19/24 1628 96 %   O2 Device 07/19/24 1800 None (Room air)     Vital Signs  BP: 157/80  Pulse: 66  Resp: 11  Temp: 98.1 °F (36.7 °C)  Temp src: Temporal  MAP (mmHg): (!) 101    Oxygen Therapy  SpO2: 99 %  O2 Device: None (Room air)    Physical Exam  GENERAL: Awake, alert oriented x3, nontoxic appearing.   SKIN: Normal, warm, and dry.  HEENT:  Pupils equally round and reactive to light. Conjuctiva clear.  Oropharynx is clear and moist.   Lungs: Clear to auscultation bilaterally with no rales, no retractions, and no wheezing.  HEART:  Regular rate and rhythm. S1 and S2. No murmurs, no rubs or gallops.   ABDOMEN: Soft, nontender and nondistended. Normoactive bowel sounds. No rebound. No guarding.   EXTREMITIES: Warm with brisk capillary refill.   Neuro: Speech clear. No facial asymmetry. No pronator drift. Motor strength 5 out of 5 in upper and lower extremities. Patient can ambulate with steady gait.     ED Course     Labs Reviewed   COMP METABOLIC PANEL (14) - Abnormal; Notable for the following components:       Result Value    Glucose 114 (*)     Globulin  2.7 (*)     All other components within normal limits   TROPONIN I HIGH SENSITIVITY - Normal   CBC WITH DIFFERENTIAL WITH PLATELET   CBC W/ DIFFERENTIAL     EKG    Rate, intervals and axes as noted on EKG Report.  Rate: 70  Rhythm: Sinus Rhythm  Reading: No acute changes.     MRI BRAIN (OLH=52896)  Result Date: 7/19/2024  CONCLUSION:   Acute to subacute small hemorrhage involving the left caudate head.  Findings discussed with Dr. Patton at 2018 hours on 7/19/2024 with read back.      [facility]   NIH 0    This  is a right-handed 63-year-old female no significant past medical history who presents with hypertension.  Patient reports on Monday afternoon she was not feeling good she felt lightheaded and felt off balance.  She felt like her right arm and the right leg were weak.  Her daughter gave her a glass of water and she dropped it because she could not hang onto it.  She also states she has some wavy lines in her vision in her right eye.  Differential include stroke, hypertensive urgency, intracranial hemorrhage at this is a life threat.    Patient placed on cardiac monitor, continuous pulse oximetry and IV line was established of normal saline.  patient was given IV labetalol for hypertension.  Blood pressure improved to 148/79.  Basic labs were obtained.  CBC: White blood cell count 7.8.  Hemoglobin 14.  Platelet 325.  CMP: BUN 16 paragon 0.7 per glucose 114.  Bicarb 27.  Troponin 3.    Discussed case with neurology Dr. Diaz    Discussed case with neurosurgery Dr. Zuluaga agrees with current management.    MRI brain was obtained demonstrated acute to subacute small hemorrhage involving the left caudate head    Discussed findings with Dr. Diaz.  Will place neurosurgery on consult and admit patient.  Also ordered CT brain/carotids at his request.  CT demonstrated left caudate head lesion which may represent subacute to chronic infarct or hemorrhage.    Patient and family informed of imaging findings.  Informed of need for admission.  Expresses understanding.    Discussed with [facility] hospitalist Dr. Hummel.    Disposition and Plan     Clinical Impression:  1. Hypertension, unspecified type    2. Hemorrhage in caudate nucleus (HCC)       Disposition:  Admit  7/19/2024  9:37 pm    Hospital Problems       Present on Admission  Date Reviewed: 7/19/2024            ICD-10-CM Noted POA    * (Principal) Hypertension, unspecified type I10 7/19/2024 Unknown       Nimisha Patton DO on 7/19/2024  9:39 PM      History and  Physical   Chief Complaint: Dizziness    History of Present Illness:   63 year old female who this past Monday she reports feeling dizzy after coming inside the house from being outside. She felt her right side weak as well, her daughter handed her a cup of water which she dropped. Pt also notes visual changes in her right eye- transient looked like an aura of gentile she says.no speech changes, no headache, no n, no v. No falls/ no head trauma.  Dtr has been checking the pt BP range 160 SBP, 100 DBP pt is usually low/ normal and not on any meds      /82  Pulse 76  Temp 98.1 °F (36.7 °C) (Temporal)  Resp 12  Wt 171 lb (77.6 kg)  SpO2 97%  BMI 29.35 kg/m²       Lab 07/19/24  1642   RBC 4.48   HGB 14.0   HCT 39.7   MCV 88.6   MCH 31.3   MCHC 35.3   RDW 12.4   NEPRELIM 4.69   WBC 7.8   .0      *   BUN 16   CREATSERUM 0.77   EGFRCR 86   CA 9.2   ALB 4.6      K 3.5      CO2 27.0   ALKPHO 70   AST 26   ALT 43   BILT 0.7   TP 7.3     TROPHS 3       Assessment & Plan:  #Dizziness, Transient R sided weakness/numbness and visual changes - 5 days ago,resolved   CT head with small caudate bleed vs stroke ?  Hold off ASA, start statin  Echo  NeuroSx on CS- OBS  Neurology on Cs  PRN for  HTN  PRN xanax pt v anxious  Fatmata Batista MD  7/19/2024  8:45 PM       7/20/2024:  Neurosurgery Consult   REASON FOR CONSULTATION: L caudate hemorrhage, Right sided weakness      HISTORY OF PRESENT ILLNESS:  63 year old female with a pertinent PMH of HTN and hypercholesterolemia     The patient presented to the ED for HTN. Patient with right sided weakness, light headedness and poor balance beginning Monday. Was unable to hold a glass of water. Also endorsed some wavy lines in her vision of the right eye. MRI brain was ordered, acute to subacute small hemorrhage involved the left caudate head noted. CTA head and neck was completed noting, the left caudate head lesion may represent a subacute to chronic  infarct or hemorrhage. NSGY was consulted.      Patient endorses prior to this, 7/5, she had a severe headache. She stayed home from work.     /69 (BP Location: Right arm)  Pulse 81  Temp 97.7 °F (36.5 °C) (Oral)  Resp 16  Wt 171 lb (77.6 kg)  SpO2 96%  BMI 29.35 kg/m²       Component Value Date     WBC 7.8 07/19/2024     HGB 14.0 07/19/2024     HCT 39.7 07/19/2024     .0 07/19/2024     CREATSERUM 0.77 07/19/2024     BUN 16 07/19/2024      07/19/2024     K 3.5 07/19/2024      07/19/2024     CO2 27.0 07/19/2024      07/19/2024     CA 9.2 07/19/2024     ALB 4.6 07/19/2024     ALKPHO 70 07/19/2024     BILT 0.7 07/19/2024     TP 7.3 07/19/2024     AST 26 07/19/2024     ALT 43 07/19/2024     INR 1.06 07/20/2024     PTP 13.9 07/20/2024       CTA BRAIN + CTA CAROTIDS   CONCLUSION:    1. No acute intracranial process.       2. The left caudate head lesion may represent a subacute to chronic infarct or hemorrhage.     3. Unremarkable CTA of the [id number]-rw-Hafppc.     4. Unremarkable CT a of the neck     Assessment/Plan:  Tiny BG hemorrhage. Stable on CTA and MRI. Stroke prevention per neurology. No need for neurosurgical follow up at this time. Neurosurgery to follow from a distance, will remain available upon request.  Swapnil Zuluaga MD  7/20/2024  5:48 PM     NEUROLOGY   CONSULT NOTE    Chief Complaint   Patient presents with    Hypertension     63 year old female presented to the emergency department complaints of right-sided weakness.  Patient was also noted to have significantly elevated blood pressure.  An MRI of the brain was performed which reported small left caudate intracerebral bleed.  Patient currently feels much better.  Denies headache, nausea, vomiting, diplopia, dysarthria, dysphagia.  Also does not feel significantly weak.     Temp:  [97.6 °F (36.4 °C)-98.1 °F (36.7 °C)] 97.6 °F (36.4 °C)  Pulse:  [64-91] 83  Resp:  [11-22] 20  BP: (117-186)/(61-95) 136/72  SpO2:  [93  %-100 %] 96 %    Component Value Date      07/19/2024     HDL 39 07/19/2024     TRIG 288 07/19/2024     VLDL 53 07/19/2024     ASSESSMENT/PLAN   63 year old female with:     Acute/subacute left caudate intracerebral bleed with mild right hemiparetic symptoms.  Clinical symptoms resolved.  CTA of the head and neck was unremarkable.  Advise close blood pressure monitoring with systolic blood pressure parameters of 100-150.  Continue neurochecks.  Neurosurgery's recommendation reviewed.  Newly diagnosed hypertension.  Patient started on amlodipine.  Blood pressure parameters as mentioned above.  Hyperlipidemia with LDL of 128.  Advise atorvastatin 40 mg daily.  OT/PT/rehab eval.  Patient and family members counseled.     Principal Problem:    Hypertension, unspecified type  Active Problems:    Hemorrhage in caudate nucleus (HCC)  Laci Ontiveros MD  7/20/2024  3:35 PM       7/21/2024:  Neurology   Denies headache, nausea, vomiting, new weakness, numbness, paresthesias, confusion. Blood pressure mostly reasonably controlled.       07/21/24 1200   BP: (!) 149/96   Pulse: 74   Resp: 15   Temp: 98.3 °F (36.8 °C)      Component Value Date     A1C 5.2 07/20/2024     Impression/MDM.  Acute/subacute left caudate intracerebral bleed with mild right hemiparetic symptoms.  Clinical symptoms resolved.  CTA of the head and neck was unremarkable.  Advise close blood pressure monitoring with systolic blood pressure parameters of 100-150.  Continue neurochecks.  Neurosurgery's recommendation reviewed.  Newly diagnosed hypertension.  Patient started on amlodipine.  Blood pressure parameters as mentioned above.  Mostly controlled in the last 24 hours.  Hyperlipidemia with LDL of 128.  Advise atorvastatin 40 mg daily.  OT/PT/rehab eval.  Patient and family members counseled.  Patient to follow-up with neurosurgery as per the recommendations.  Laci Ontiveros MD   7/21/2024  3:39 PM       MEDICATIONS  ADMINISTERED:  Medications 07/19/24 07/20/24 07/21/24   amLODIPine (Norvasc) tab 5 mg  Dose: 5 mg  Freq: Once Route: OR  Start: 07/21/24 1145 End: 07/21/24 1200      1200 CB-Given          amLODIPine (Norvasc) tab 5 mg  Dose: 5 mg  Freq: Daily Route: OR  Start: 07/20/24 0815 End: 07/21/24 1741     1114 KJ-Given        0910 CB-Given   1741-D/C'd       atorvastatin (Lipitor) tab 40 mg  Dose: 40 mg  Freq: Nightly Route: OR  Start: 07/19/24 2245 End: 07/21/24 1741    2305 KH-Given        2006 KT-Given        1741-D/C'd        hydrALAzine (Apresoline) 20 mg/mL injection 10 mg  Dose: 10 mg  Freq: Once Route: IV  Start: 07/19/24 2125 End: 07/19/24 2127 2127 EM-Given            labetalol (Trandate) 5 mg/mL injection 20 mg  Dose: 20 mg  Freq: Once Route: IV  Start: 07/19/24 2032 End: 07/19/24 2033 2033 EM-Given            labetalol (Trandate) 5 mg/mL injection 20 mg  Dose: 20 mg  Freq: Once Route: IV  Start: 07/19/24 1823 End: 07/19/24 1836 1836 EM-Given              sodium chloride 0.9% infusion  Rate: 75 mL/hr  Freq: Continuous Route: IV  Start: 07/19/24 2245 End: 07/21/24 1741   Admin Instructions:   After 48 hours saline lock if taking PO    2306 KH-New Bag        1154 JK-New Bag        0029 KT-New Bag   1741-D/C'd         acetaminophen (Tylenol Extra Strength) tab 1,000 mg  Dose: 1,000 mg  Freq: Every 8 hours PRN Route: OR  PRN Reasons: Fever,Headaches,moderate pain  PRN Comment: equal to or greater than 100.4  Start: 07/19/24 2237 End: 07/21/24 1741   Admin Instructions:   do not initiate oral therapy until 6-8 hours after the last IV acetaminophen dose if IV acetaminophen was used previously     1118 KJ-Given        1741-D/C'd        ALPRAZolam (Xanax) tab 0.25 mg  Dose: 0.25 mg  Freq: Nightly PRN Route: OR  PRN Reason: Anxiety  Start: 07/19/24 2236 End: 07/21/24 1741    9620 KH-Given         1741-D/C'd        iopamidol 76% (ISOVUE-370) injection for power injector  Dose: 75 mL  Freq: IMG once as needed  Route: IV  PRN Reason: contrast  Start: 07/19/24 2113 End: 07/19/24 2113 2113 CL-Given              Vitals (last day) before discharge       Date/Time Temp Pulse Resp BP SpO2 Weight O2 Device O2 Flow Rate (L/min) [facility]    07/21/24 1200 98.3 °F (36.8 °C) 74 15 149/96 95 % -- None (Room air) --     07/21/24 0800 97.8 °F (36.6 °C) 94 19 159/93 94 % -- None (Room air) --     07/21/24 0600 -- 98 25 -- 98 % -- -- -- AS    07/21/24 0400 98.2 °F (36.8 °C) 64 15 151/92 93 % -- None (Room air) -- AS    07/21/24 0000 97.7 °F (36.5 °C) 67 16 122/84 95 % -- None (Room air) -- AS    07/20/24 2000 98.1 °F (36.7 °C) 82 18 121/72 94 % -- None (Room air) -- AS    07/20/24 1758 -- 107 19 -- 96 % -- -- --     07/20/24 1545 97.7 °F (36.5 °C) 92 13 132/81 94 % -- None (Room air) --     07/20/24 1145 97.6 °F (36.4 °C) 83 20 136/72 96 % -- None (Room air) --     07/20/24 0715 97.7 °F (36.5 °C) 81 16 142/69 96 % -- None (Room air) --     07/20/24 0539 -- 83 15 117/79 96 % -- -- --     07/20/24 0538 -- 72 15 128/71 97 % -- -- -- CD    07/20/24 0537 -- 69 14 127/75 95 % -- -- -- CD    07/20/24 0400 97.7 °F (36.5 °C) 65 14 127/76 94 % -- None (Room air) --     07/20/24 0030 97.6 °F (36.4 °C) 91 22 124/61 94 % -- None (Room air) -- CD   07/19/24 2235----------171 lb (77.6 kg)----KH07/19/24 2215--7138315/8197 %--None (Room air)--EM07/19/24 2145--1785781/8297 %--None (Room air)--EM07/19/24 2130--5231495/8099 %--None (Room air)--EM07/19/24 2115--5747181/92 Abnormal 96 %--None (Room air)--EM07/19/24 2045--0547746/95 Abnormal 97 %--None (Room air)--EM07/19/24 2036--6946416/92 Abnormal 100 %--None (Room air)--EM07/19/24 2030--8721430/11087 %--None (Room air)--EM07/19/24 2015--2490366/7597 %--None (Room air)--EM07/19/24 1945--6117851/92 Abnormal 96 %--None (Room air)--EM07/19/24 1845--7856808/7993 %--None (Room air)--EM07/19/24 1830--8017194/85 Abnormal 95 %--None (Room air)--EM07/19/24 1804------------None (Room air)--EM07/19/24  1800--3555516/85 Abnormal 93 %--None (Room air)--EM07/19/24 769361.1 °F (36.7 °C)1999619/8996 %171 lb (77.6 kg)----LB       FOR REVIEW/APPROVAL OF INPT ADMISSION

## 2024-07-24 ENCOUNTER — PATIENT OUTREACH (OUTPATIENT)
Dept: CASE MANAGEMENT | Age: 64
End: 2024-07-24

## 2024-07-24 NOTE — PROGRESS NOTES
Hospital follow up.    Visit Type: Stroke Follow-up  Date of TIA/Stroke: 7/19/2024  Type of Stroke: Hemorrhagic  Patient to follow-up: 3 months  Thursday 10/10 @11:20    Confirmed with patient.    Closing encounter.

## 2024-07-31 ENCOUNTER — OFFICE VISIT (OUTPATIENT)
Dept: FAMILY MEDICINE CLINIC | Facility: CLINIC | Age: 64
End: 2024-07-31
Payer: COMMERCIAL

## 2024-07-31 VITALS
BODY MASS INDEX: 28.51 KG/M2 | OXYGEN SATURATION: 98 % | DIASTOLIC BLOOD PRESSURE: 80 MMHG | WEIGHT: 167 LBS | SYSTOLIC BLOOD PRESSURE: 126 MMHG | HEART RATE: 88 BPM | RESPIRATION RATE: 16 BRPM | TEMPERATURE: 97 F | HEIGHT: 64 IN

## 2024-07-31 DIAGNOSIS — I10 ESSENTIAL HYPERTENSION: ICD-10-CM

## 2024-07-31 DIAGNOSIS — E66.3 OVERWEIGHT (BMI 25.0-29.9): ICD-10-CM

## 2024-07-31 DIAGNOSIS — I61.9 NONTRAUMATIC INTRACEREBRAL HEMORRHAGE, UNSPECIFIED CEREBRAL LOCATION, UNSPECIFIED LATERALITY (HCC): Primary | ICD-10-CM

## 2024-07-31 DIAGNOSIS — E78.5 HYPERLIPIDEMIA, UNSPECIFIED HYPERLIPIDEMIA TYPE: ICD-10-CM

## 2024-07-31 DIAGNOSIS — R21 RASH: ICD-10-CM

## 2024-07-31 PROCEDURE — 3008F BODY MASS INDEX DOCD: CPT | Performed by: FAMILY MEDICINE

## 2024-07-31 PROCEDURE — 3079F DIAST BP 80-89 MM HG: CPT | Performed by: FAMILY MEDICINE

## 2024-07-31 PROCEDURE — 3074F SYST BP LT 130 MM HG: CPT | Performed by: FAMILY MEDICINE

## 2024-07-31 PROCEDURE — 99214 OFFICE O/P EST MOD 30 MIN: CPT | Performed by: FAMILY MEDICINE

## 2024-07-31 PROCEDURE — 1111F DSCHRG MED/CURRENT MED MERGE: CPT | Performed by: FAMILY MEDICINE

## 2024-07-31 RX ORDER — TRIAMCINOLONE ACETONIDE 1 MG/G
CREAM TOPICAL 2 TIMES DAILY PRN
Qty: 60 G | Refills: 3 | Status: SHIPPED | OUTPATIENT
Start: 2024-07-31

## 2024-07-31 NOTE — PROGRESS NOTES
HISTORY:  Chief Complaint   Patient presents with    ER F/U     Pt had a stroke, she was recently put on both her medication, she has a rash on both arms, not sure if they're related     Fu from hospital   Has ich was seen by nsg and neuro   On bp meds now bp was initially high now better   Cholesterols high on statins now  Check bp  states running good  Here with daughter   Says neurological symptoms resolved ,says feels great   No headache or dizziness  No nv  No visual issues  Ni numbness or tingling or any focal weakness  No new memory issues  No dizziness  Bp as below         Minimal rash on the right ue  no other fever chills nv   No cough congestion no ms aches or jt pain   No lip or tongue swelling   No swallowing issues     She likes taking hot showers    The following portions of the patient's history were reviewed and updated as appropriate:  Past Medical History:    Cervicitis and endocervicitis     Patient Active Problem List   Diagnosis    Unspecified hypothyroidism    Hypercholesterolemia    Hypertension, unspecified type    Hemorrhage in caudate nucleus (HCC)     Past Surgical History:   Procedure Laterality Date    Other surgical history      cervix cryosurgery    Tubal ligation       Family History   Problem Relation Age of Onset    High Cholesterol Father     Hypertension Father     Breast Cancer Paternal Grandmother 60        In her 60's.    Hypertension Mother     Thyroid Disorder Mother     High Cholesterol Mother     Other (colitis) Mother     High Cholesterol Sister     Hypertension Sister     High Cholesterol Brother     Hypertension Brother      Social History     Socioeconomic History    Marital status:    Tobacco Use    Smoking status: Never    Smokeless tobacco: Never   Vaping Use    Vaping status: Never Used   Substance and Sexual Activity    Alcohol use: Yes     Alcohol/week: 0.0 standard drinks of alcohol     Comment: 2 times month    Drug use: No   Other Topics Concern     Caffeine Concern Yes     Comment: coffee daily    Exercise Yes       Current Outpatient Medications   Medication Sig Dispense Refill    triamcinolone 0.1 % External Cream Apply topically 2 (two) times daily as needed. 60 g 3    amLODIPine 10 MG Oral Tab Take 1 tablet (10 mg total) by mouth daily. 90 tablet 1    atorvastatin 40 MG Oral Tab Take 1 tablet (40 mg total) by mouth nightly. 90 tablet 1       No Known Allergies      Review of Systems  Const: Denies fever chills  Eyes: Denies drainage no pain with movement of eye  ENT: denies sore throat,no ear pain ,no sinus drainage  CV: Denies chest pain or palpitations  Pulm: Denies shortness of breath  GI: Denies abdominal pain, nausea, vomiting or diarrhea  : Denies dysuria  Musculoskeletal: Denies neck or back pain  Skin: slight rash right ue   Neuro: Denies focal weakness or numbness,no headache or dizziness      Vitals:    07/31/24 1223   BP: 126/80   Pulse: 88   Resp: 16   Temp: 97 °F (36.1 °C)   TempSrc: Oral   SpO2: 98%   Weight: 167 lb (75.8 kg)   Height: 5' 4\" (1.626 m)        Physical Exam  General Appearance:  Alert, oriented, in no acute distress  Eyes no discharge  Neck ;soft supple,no obvious swelling  CNS: no acute focal neurological deficits  Chest Wall:  no chest wall tenderness.  Lungs:  Normal expansion.  Clear to auscultation.   Heart:  Heart sounds are normal.    Abdomen:  Soft, non-tender, normal bowel sounds;  Psych mood and affect appropriate  skin mild erythematous rash right ue   No swelling no signs of infection no open area    Lower Extremities: No gross edema,        Assessment/Plan:    Herlinda was seen today for er f/u.    Diagnoses and all orders for this visit:    Nontraumatic intracerebral hemorrhage, unspecified cerebral location, unspecified laterality (HCC)  Clinically better says already made apt with neuro   No new symptoms  Doing better since they left hospital   Red flags dw   Any neurological symptoms need to go to er they  understand  Essential hypertension  Watch bp closely   Drink fluids  Let pcp know if bp running high   Hyperlipidemia, unspecified hyperlipidemia type  Statins diet exercise  Overweight (BMI 25.0-29.9)  Diet exercise  Rash  -     triamcinolone 0.1 % External Cream; Apply topically 2 (two) times daily as needed.      Patient Active Problem List   Diagnosis    Unspecified hypothyroidism    Hypercholesterolemia    Hypertension, unspecified type    Hemorrhage in caudate nucleus (HCC)       Patient's Body mass index is 28.67 kg/m².    .    No follow-ups on file.      triamcinolone 0.1 % External Cream, Apply topically 2 (two) times daily as needed., Disp: 60 g, Rfl: 3    amLODIPine 10 MG Oral Tab, Take 1 tablet (10 mg total) by mouth daily., Disp: 90 tablet, Rfl: 1    atorvastatin 40 MG Oral Tab, Take 1 tablet (40 mg total) by mouth nightly., Disp: 90 tablet, Rfl: 1    Rey Boss MD  7/31/2024

## 2024-08-06 NOTE — PROGRESS NOTES
Multiple attempts to reach patient and messages left with no return call.  Patient went in for hospital follow-up appointment with primary care provider office on 7/31/24.  Encounter closing.

## 2024-08-29 ENCOUNTER — OFFICE VISIT (OUTPATIENT)
Dept: FAMILY MEDICINE CLINIC | Facility: CLINIC | Age: 64
End: 2024-08-29
Payer: COMMERCIAL

## 2024-08-29 VITALS
SYSTOLIC BLOOD PRESSURE: 122 MMHG | WEIGHT: 163 LBS | DIASTOLIC BLOOD PRESSURE: 70 MMHG | HEART RATE: 80 BPM | BODY MASS INDEX: 28 KG/M2 | OXYGEN SATURATION: 98 %

## 2024-08-29 DIAGNOSIS — L98.9 SKIN LESIONS: ICD-10-CM

## 2024-08-29 DIAGNOSIS — Z13.29 THYROID DISORDER SCREEN: ICD-10-CM

## 2024-08-29 DIAGNOSIS — Z12.12 SCREENING FOR COLORECTAL CANCER: ICD-10-CM

## 2024-08-29 DIAGNOSIS — Z00.00 WELLNESS EXAMINATION: Primary | ICD-10-CM

## 2024-08-29 DIAGNOSIS — Z86.79 HX OF CEREBRAL PARENCHYMAL HEMORRHAGE: ICD-10-CM

## 2024-08-29 DIAGNOSIS — M85.80 OSTEOPENIA, UNSPECIFIED LOCATION: ICD-10-CM

## 2024-08-29 DIAGNOSIS — Z13.0 SCREENING FOR DEFICIENCY ANEMIA: ICD-10-CM

## 2024-08-29 DIAGNOSIS — Z12.11 SCREENING FOR COLORECTAL CANCER: ICD-10-CM

## 2024-08-29 DIAGNOSIS — Z12.31 ENCOUNTER FOR SCREENING MAMMOGRAM FOR MALIGNANT NEOPLASM OF BREAST: ICD-10-CM

## 2024-08-29 DIAGNOSIS — Z23 NEED FOR SHINGLES VACCINE: ICD-10-CM

## 2024-08-29 DIAGNOSIS — Z13.220 LIPID SCREENING: ICD-10-CM

## 2024-08-29 PROCEDURE — 3074F SYST BP LT 130 MM HG: CPT | Performed by: FAMILY MEDICINE

## 2024-08-29 PROCEDURE — 90750 HZV VACC RECOMBINANT IM: CPT | Performed by: FAMILY MEDICINE

## 2024-08-29 PROCEDURE — 99397 PER PM REEVAL EST PAT 65+ YR: CPT | Performed by: FAMILY MEDICINE

## 2024-08-29 PROCEDURE — 90471 IMMUNIZATION ADMIN: CPT | Performed by: FAMILY MEDICINE

## 2024-08-29 PROCEDURE — 3078F DIAST BP <80 MM HG: CPT | Performed by: FAMILY MEDICINE

## 2024-08-29 NOTE — PROGRESS NOTES
HPI:     Herlinda Hernandez is a 63 year old female who presents for an Annual Health Visit.      Wanted to discuss her recent hemorrhage.    The Monday prior midday was moving something in the driveway, felt dizzy and off. Was hot that day. Dropped the glass. BP was in 160s/100s. Was feeling better, and her daughter wanted her to go in to ER. All week long BP was high still in 160s systolic.  Was planning on coming in, but saw Dr. Boss and went to ER and found it.    Now not feeling any impact of it. Feet have been swelling, not too bad today.    Blood pressure has been well controlled since the, has follow up with neurology.    Had a rash that resolved.    Quick head movements she worries about, like jumping jacks.    Allergies:   No Known Allergies    CURRENT MEDICATIONS:   Current Outpatient Medications   Medication Sig Dispense Refill    triamcinolone 0.1 % External Cream Apply topically 2 (two) times daily as needed. 60 g 3    amLODIPine 10 MG Oral Tab Take 1 tablet (10 mg total) by mouth daily. 90 tablet 1    atorvastatin 40 MG Oral Tab Take 1 tablet (40 mg total) by mouth nightly. 90 tablet 1      MEDICAL INFORMATION:   Past Medical History:    Cervicitis and endocervicitis      Past Surgical History:   Procedure Laterality Date    Other surgical history      cervix cryosurgery    Tubal ligation        Family History   Problem Relation Age of Onset    High Cholesterol Father     Hypertension Father     Breast Cancer Paternal Grandmother 60        In her 60's.    Hypertension Mother     Thyroid Disorder Mother     High Cholesterol Mother     Other (colitis) Mother     High Cholesterol Sister     Hypertension Sister     High Cholesterol Brother     Hypertension Brother       SOCIAL HISTORY:   Social History     Socioeconomic History    Marital status:    Tobacco Use    Smoking status: Never    Smokeless tobacco: Never   Vaping Use    Vaping status: Never Used   Substance and Sexual Activity     Alcohol use: Yes     Alcohol/week: 0.0 standard drinks of alcohol     Comment: 2 times month    Drug use: No   Other Topics Concern    Caffeine Concern Yes     Comment: coffee daily    Exercise Yes   Social History Narrative    ** Merged History Encounter **          Social Determinants of Health     Food Insecurity: No Food Insecurity (7/19/2024)    Food Insecurity     Food Insecurity: Never true   Transportation Needs: No Transportation Needs (7/19/2024)    Transportation Needs     Lack of Transportation: No   Housing Stability: Low Risk  (7/19/2024)    Housing Stability     Housing Instability: No     Social History     Social History Narrative    ** Merged History Encounter **             REVIEW OF SYSTEMS:     Constitutional: negative  Eyes: negative  ENT: negative  Respiratory: negative  Cardiovascular: negative  Gastrointestinal: negative  Integument/Breast: negative  Genitourinary: negative  Heme/Lymph: negative  Musculoskeletal: negative  Neurological: negative  Psych: negative  Endocrine: negative  Allergic/Immune: negative        EXAM:   /70   Pulse 80   Wt 163 lb (73.9 kg)   SpO2 98%   BMI 27.98 kg/m²    Wt Readings from Last 6 Encounters:   08/29/24 163 lb (73.9 kg)   07/31/24 167 lb (75.8 kg)   07/19/24 171 lb (77.6 kg)   07/19/24 172 lb (78 kg)   05/20/24 165 lb (74.8 kg)   09/08/23 165 lb (74.8 kg)     Body mass index is 27.98 kg/m².    General: alert, appears stated age, and cooperative  Head: Normocephalic, without obvious abnormality, atraumatic  Eyes: conjunctivae/corneas clear. PERRL, EOM's intact. Fundi benign.  Ears: normal TM's and external ear canals both ears  Nose: Nares normal. Septum midline. Mucosa normal. No drainage or sinus tenderness.  Throat: lips, mucosa, and tongue normal; teeth and gums normal  Neck: no adenopathy, no carotid bruit, no JVD, supple, symmetrical, trachea midline, and thyroid not enlarged, symmetric, no tenderness/mass/nodules  Heart: S1, S2 normal, no  murmur, click, rub or gallop, regular rate and rhythm  Lungs: clear to auscultation bilaterally  Breast:  deferred  Abdomen: soft, non-tender; bowel sounds normal; no masses,  no organomegaly  Pelvic: deferred  Back: symmetric, no curvature. ROM normal. No CVA tenderness.  Extremities: extremities normal, atraumatic, no cyanosis or edema  Pulses: 2+ and symmetric  Skin: Skin color, texture, turgor normal. No rashes or lesions  Lymph Nodes: Cervical, supraclavicular, and axillary nodes normal.  Neurologic: Grossly normal      ASSESSMENT AND PLAN:   Herlinda was seen today for physical.    Diagnoses and all orders for this visit:    Wellness examination  -     CBC With Differential With Platelet; Future  -     Comp Metabolic Panel (14); Future  -     Lipid Panel; Future  -     TSH W Reflex To Free T4; Future    Lipid screening  -     Lipid Panel; Future    Screening for deficiency anemia  -     CBC With Differential With Platelet; Future    Thyroid disorder screen  -     TSH W Reflex To Free T4; Future    Hx of cerebral parenchymal hemorrhage    Encounter for screening mammogram for malignant neoplasm of breast  -     [facility] MELVA 2D+3D SCREENING BILAT (CPT=77067/85483); Future    Screening for colorectal cancer  -     Gastro Referral - In Network    Skin lesions  -     Derm Referral - In Network    Osteopenia, unspecified location  -     Vitamin D [E]; Future    Need for shingles vaccine  -     Zoster Recombinant Adjuvanted (Shingrix -Shingles) [73573]    Overall doing well, will get shingles vaccine completed. Had dexa scan last karin  There are no Patient Instructions on file for this visit.    The patient indicates understanding of these issues and agrees to the plan.    Problem List:  Patient Active Problem List   Diagnosis    Unspecified hypothyroidism    Hypercholesterolemia    Hypertension, unspecified type    Hemorrhage in caudate nucleus (HCC)       Jose De Leon MD  8/29/2024  10:42 AM

## 2024-10-10 ENCOUNTER — OFFICE VISIT (OUTPATIENT)
Dept: NEUROLOGY | Facility: CLINIC | Age: 64
End: 2024-10-10
Payer: COMMERCIAL

## 2024-10-10 VITALS
SYSTOLIC BLOOD PRESSURE: 130 MMHG | HEART RATE: 70 BPM | RESPIRATION RATE: 16 BRPM | BODY MASS INDEX: 28 KG/M2 | OXYGEN SATURATION: 99 % | DIASTOLIC BLOOD PRESSURE: 72 MMHG | WEIGHT: 161.81 LBS

## 2024-10-10 DIAGNOSIS — I61.0 BASAL GANGLIA HEMORRHAGE (HCC): Primary | ICD-10-CM

## 2024-10-10 PROCEDURE — 99215 OFFICE O/P EST HI 40 MIN: CPT | Performed by: OTHER

## 2024-10-10 PROCEDURE — 3078F DIAST BP <80 MM HG: CPT | Performed by: OTHER

## 2024-10-10 PROCEDURE — 3075F SYST BP GE 130 - 139MM HG: CPT | Performed by: OTHER

## 2024-10-10 NOTE — PATIENT INSTRUCTIONS
After your visit at the [place] office  today, please direct any follow up questions or medication needs to the staff in our [place] office so that your concerns may be promptly addressed.  We are available through [email or web address] or at the numbers below:    The phone number is:   [phone], option 1    The fax number is:  [phone]    Your pharmacy should also send any requests electronically to the [place] office.       Refill policies:    Allow 2-3 business days for refills; controlled substances may take longer.  Contact your pharmacy at least 5 days prior to running out of medication and have them send an electronic request or submit request through the “request refill” option in your [email or web address] account.  Refills are not addressed on weekends; covering physicians do not authorize routine medications on weekends.  No narcotics or controlled substances are refilled after noon on Fridays or by on call physicians.  By law, narcotics must be electronically prescribed.  A 30 day supply with no refills is the maximum allowed.  If your prescription is due for a refill, you may be due for a follow up appointment.  To best provide you care, patients receiving routine medications need to be seen at least once a year.  Patients receiving narcotic/controlled substance medications need to be seen at least once every 3 months.  In the event that your preferred pharmacy does not have the requested medication in stock (e.g. Backordered), it is your responsibility to find another pharmacy that has the requested medication available.  We will gladly send a new prescription to that pharmacy at your request.    Scheduling Tests:    If your physician has ordered radiology tests such as MRI or CT scans, please contact Central Scheduling at [phone] right away to schedule the test.  Once scheduled, the [facility] Centralized Referral Team will work with your insurance carrier to obtain pre-certification or prior authorization.   Depending on your insurance carrier, approval may take 3-10 days.  It is highly recommended patients assure they have received an authorization before having a test performed.  If test is done without insurance authorization, patient may be responsible for the entire amount billed.      Precertification and Prior Authorizations:  If your physician has recommended that you have a procedure or additional testing performed the [facility] Centralized Referral Team will contact your insurance carrier to obtain pre-certification or prior authorization.    You are strongly encouraged to contact your insurance carrier to verify that your procedure/test has been approved and is a COVERED benefit.  Although the [facility] Centralized Referral Team does its due diligence, the insurance carrier gives the disclaimer that \"Although the procedure is authorized, this does not guarantee payment.\"    Ultimately the patient is responsible for payment.   Thank you for your understanding in this matter.  Paperwork Completion:  If you require FMLA or disability paperwork for your recovery, please make sure to either drop it off or have it faxed to our office at [phone]. Be sure the form has your name and date of birth on it.  The form will be faxed to our Forms Department and they will complete it for you.  There is a 25$ fee for all forms that need to be filled out.  Please be aware there is a 10-14 day turnaround time.  You will need to sign a release of information (GRACE) form if your paperwork does not come with one.  You may call the Forms Department with any questions at [phone].  Their fax number is [phone].

## 2024-10-10 NOTE — PROGRESS NOTES
HPI:    Patient ID: Herlinda Hernandez is a 63 year old female.    HPI  Herlinda Fonseca is a 63-year-old female who presented to for hospital follow-up for left basal ganglion hemorrhage.  Patient states that she has had elevated blood pressure in the past but never was diagnosed with hypertension.  On 7/19 she presented to [facility] with hypertension. Reports 4 days before she came to the ED had an episode of lost of balance, felt right sided was weak, her daughter gave her a glass of water and she dropped it. States symptoms lasted for few minutes and then resolved on its own. She was monitoring her blood pressure throughout the week and it has been running high. She went to see her PCP and was directed to go to the ED.  MRI of the brain obtained showed demonstrated subacute small hemorrhage involving the left caudate head..  CTA head and neck was negative for any aneurysm or vascular abnormality.   She denied any residual symptoms.  Blood pressure is well-controlled now    HISTORY:  Past Medical History:    Cervicitis and endocervicitis    Hemorrhagic stroke (HCC)    High blood cholesterol    High blood pressure      Past Surgical History:   Procedure Laterality Date    Other surgical history      cervix cryosurgery    Tubal ligation        Family History   Problem Relation Age of Onset    High Cholesterol Father     Hypertension Father     Breast Cancer Paternal Grandmother 60        In her 60's.    Hypertension Mother     Thyroid Disorder Mother     High Cholesterol Mother     Other (colitis) Mother     High Cholesterol Sister     Hypertension Sister     High Cholesterol Brother     Hypertension Brother       Social History     Socioeconomic History    Marital status:    Tobacco Use    Smoking status: Never    Smokeless tobacco: Never   Vaping Use    Vaping status: Never Used   Substance and Sexual Activity    Alcohol use: Yes     Alcohol/week: 0.0 standard drinks of alcohol     Comment: 2 times  month    Drug use: No   Other Topics Concern    Caffeine Concern No    Exercise No   Social History Narrative    ** Merged History Encounter **          Social Drivers of Health     Food Insecurity: No Food Insecurity (7/19/2024)    Food Insecurity     Food Insecurity: Never true   Transportation Needs: No Transportation Needs (7/19/2024)    Transportation Needs     Lack of Transportation: No   Housing Stability: Low Risk  (7/19/2024)    Housing Stability     Housing Instability: No        Review of Systems   Constitutional: Negative.    HENT: Negative.     Eyes: Negative.    Respiratory: Negative.     Cardiovascular: Negative.    Gastrointestinal: Negative.    Endocrine: Negative.    Genitourinary: Negative.    Musculoskeletal: Negative.    Skin: Negative.    Allergic/Immunologic: Negative.    Neurological: Negative.    Hematological: Negative.    Psychiatric/Behavioral: Negative.     All other systems reviewed and are negative.           Current Outpatient Medications   Medication Sig Dispense Refill    triamcinolone 0.1 % External Cream Apply topically 2 (two) times daily as needed. 60 g 3    amLODIPine 10 MG Oral Tab Take 1 tablet (10 mg total) by mouth daily. 90 tablet 1    atorvastatin 40 MG Oral Tab Take 1 tablet (40 mg total) by mouth nightly. 90 tablet 1     Allergies:Allergies[1]  PHYSICAL EXAM:   Physical Exam    Blood pressure 130/72, pulse 70, resp. rate 16, weight 161 lb 12.8 oz (73.4 kg), SpO2 99%, not currently breastfeeding.  General Appearance: Well nourished, well developed, no apparent distress.     HEENT: Normocephalic and atraumatic. Normal sclera.   Cardiovascular: Normal rate, regular rhythm and normal heart sounds.    Pulmonary/Chest: Effort normal and breath sounds normal.   Abdominal: Soft. Bowel sounds are normal.   Skin: dry, clean and intact  Ext: peripheral pulses present  Psych: normal mood and affect    Neurological:  Patient is awake, alert and oriented to person, place and time    Normal memory, attention/concentration, speech and language.    Cranial Nerves:   II: Visual acuity: normal  III: Pupils: equal, round, reactive to light  III,IV,VI: Extra Ocular Movements: intact  V: Facial sensation: intact  VII: Facial strength: intact  VIII: Hearing: intact  IX: Palate: intact  XI: Shoulder shrug: intact  XII: Tongue movement: normal    Motor: Normal tone. Strength is  5 out of 5 in all extremities bilaterally.    Sensory: Sensory examination is normal to light touch and pinprick     Coordination: Finger-to-nose normal bilaterally without evidence of dysmetria.    Gait: normal casual gait         NIHSS - 0           MRS- 0     1a. Level of consciousness: 0  1b. LOC Questions: 0  1c. LOC Commands: 0  2. Best Gaze: 0  3. Visual: 0  4. Facial Palsy:0  5a. Left Arm:0  5b. Right Arm:0  6a. Left Le  6b. Right Le  7. Limb Ataxia: 0  8. Sensory: 0  9. Best Language:0  11. Extinction and Inattention (formerly Neglect):0      TESTS/IMAGING:       CTA head and neck    Impression   CONCLUSION:       1. No acute intracranial process.       2. The left caudate head lesion may represent a subacute to chronic infarct or hemorrhage.     3. Unremarkable CTA of the [id number].     4. Unremarkable CT a of the neck       MRI brain: 2024                Impression   CONCLUSION:       Acute to subacute small hemorrhage involving the left caudate head.  Findings discussed with Dr. Patton at 2018 hours on 2024 with read back.             ASSESSMENT/PLAN:       ICD-10-CM    1. Basal ganglia hemorrhage (HCC)  I61.0         Small left caudate hemorrhage secondary to uncontrolled hypertension  MRI brain and CTA head and neck reviewed.  There is no aneurysm or any vascular malformations    Continue amlodipine for optimal control of blood pressure.  Follow-up with primary for hypertension and hyperlipidemia management    Counseled the patient on secondary stroke prevention   Hypertension - Target  blood pressure  <130/85 for high risk, normal 120/80   Target exercise at least 3 times per week -150 minutes per week  Hyperlipidemia - Target total cholesterol < 200, Target LDL < 70 Target HDL >45 , Target triglycerides <150        Thank you for allowing us to participate in your patient's care.        I spent 45 minutes on the day of the encounter related to this visit: Preparing to see the patient (e.g. review of tests), Obtaining and/or reviewing separately obtained history, Performing a medically appropriate examination and/or evaluation, Counseling and educating the patient/family/caregiver, Ordering medications, tests, or procedures and Documenting clinical information in the electronic health record, not including separately reported activities or procedures.          Chuyita Rosen MD   [facility] [place]      This note was prepared using Dragon Medical voice recognition dictation software. As a result errors may occur. When identified these errors have been corrected. While every attempt is made to correct errors during dictation discrepancies may still exist         Meds This Visit:  Requested Prescriptions      No prescriptions requested or ordered in this encounter       Imaging & Referrals:  None     ID#[id number]         [1] No Known Allergies

## 2025-01-16 RX ORDER — ATORVASTATIN CALCIUM 40 MG/1
40 TABLET, FILM COATED ORAL NIGHTLY
Qty: 90 TABLET | Refills: 1 | Status: SHIPPED | OUTPATIENT
Start: 2025-01-16

## 2025-01-16 RX ORDER — AMLODIPINE BESYLATE 10 MG/1
10 TABLET ORAL DAILY
Qty: 90 TABLET | Refills: 1 | Status: SHIPPED | OUTPATIENT
Start: 2025-01-16

## 2025-01-16 NOTE — TELEPHONE ENCOUNTER
Pt needs a refill of amlodipine and atorvastatin   Sent to [facility] phone # [phone] [facility]   
Requesting   Requested Prescriptions     Pending Prescriptions Disp Refills    amLODIPine 10 MG Oral Tab 90 tablet 1     Sig: Take 1 tablet (10 mg total) by mouth daily.    atorvastatin 40 MG Oral Tab 90 tablet 1     Sig: Take 1 tablet (40 mg total) by mouth nightly.           LOV:  8/29/24    Filled: 7/20/24 #90 with 1 refills    No future appointments.    
[place]

## 2025-01-28 ENCOUNTER — LAB ENCOUNTER (OUTPATIENT)
Dept: LAB | Age: 65
End: 2025-01-28
Attending: FAMILY MEDICINE
Payer: COMMERCIAL

## 2025-01-28 DIAGNOSIS — Z13.220 LIPID SCREENING: ICD-10-CM

## 2025-01-28 DIAGNOSIS — Z13.0 SCREENING FOR DEFICIENCY ANEMIA: ICD-10-CM

## 2025-01-28 DIAGNOSIS — Z00.00 WELLNESS EXAMINATION: ICD-10-CM

## 2025-01-28 DIAGNOSIS — M85.80 OSTEOPENIA, UNSPECIFIED LOCATION: ICD-10-CM

## 2025-01-28 DIAGNOSIS — Z13.29 THYROID DISORDER SCREEN: ICD-10-CM

## 2025-01-28 LAB
ALBUMIN SERPL-MCNC: 4.5 G/DL (ref 3.2–4.8)
ALBUMIN/GLOB SERPL: 1.8 {RATIO} (ref 1–2)
ALP LIVER SERPL-CCNC: 72 U/L
ALT SERPL-CCNC: 39 U/L
ANION GAP SERPL CALC-SCNC: 9 MMOL/L (ref 0–18)
AST SERPL-CCNC: 25 U/L (ref ?–34)
BASOPHILS # BLD AUTO: 0.07 X10(3) UL (ref 0–0.2)
BASOPHILS NFR BLD AUTO: 1 %
BILIRUB SERPL-MCNC: 0.8 MG/DL (ref 0.2–1.1)
BUN BLD-MCNC: 20 MG/DL (ref 9–23)
CALCIUM BLD-MCNC: 9.1 MG/DL (ref 8.7–10.6)
CHLORIDE SERPL-SCNC: 106 MMOL/L (ref 98–112)
CHOLEST SERPL-MCNC: 138 MG/DL (ref ?–200)
CO2 SERPL-SCNC: 27 MMOL/L (ref 21–32)
CREAT BLD-MCNC: 0.79 MG/DL
EGFRCR SERPLBLD CKD-EPI 2021: 82 ML/MIN/1.73M2 (ref 60–?)
EOSINOPHIL # BLD AUTO: 0.14 X10(3) UL (ref 0–0.7)
EOSINOPHIL NFR BLD AUTO: 2 %
ERYTHROCYTE [DISTWIDTH] IN BLOOD BY AUTOMATED COUNT: 13.1 %
FASTING PATIENT LIPID ANSWER: YES
FASTING STATUS PATIENT QL REPORTED: YES
GLOBULIN PLAS-MCNC: 2.5 G/DL (ref 2–3.5)
GLUCOSE BLD-MCNC: 87 MG/DL (ref 70–99)
HCT VFR BLD AUTO: 40.1 %
HDLC SERPL-MCNC: 45 MG/DL (ref 40–59)
HGB BLD-MCNC: 13.6 G/DL
IMM GRANULOCYTES # BLD AUTO: 0.02 X10(3) UL (ref 0–1)
IMM GRANULOCYTES NFR BLD: 0.3 %
LDLC SERPL CALC-MCNC: 71 MG/DL (ref ?–100)
LYMPHOCYTES # BLD AUTO: 2.65 X10(3) UL (ref 1–4)
LYMPHOCYTES NFR BLD AUTO: 37.5 %
MCH RBC QN AUTO: 30.8 PG (ref 26–34)
MCHC RBC AUTO-ENTMCNC: 33.9 G/DL (ref 31–37)
MCV RBC AUTO: 90.9 FL
MONOCYTES # BLD AUTO: 0.55 X10(3) UL (ref 0.1–1)
MONOCYTES NFR BLD AUTO: 7.8 %
NEUTROPHILS # BLD AUTO: 3.63 X10 (3) UL (ref 1.5–7.7)
NEUTROPHILS # BLD AUTO: 3.63 X10(3) UL (ref 1.5–7.7)
NEUTROPHILS NFR BLD AUTO: 51.4 %
NONHDLC SERPL-MCNC: 93 MG/DL (ref ?–130)
OSMOLALITY SERPL CALC.SUM OF ELEC: 296 MOSM/KG (ref 275–295)
PLATELET # BLD AUTO: 301 10(3)UL (ref 150–450)
POTASSIUM SERPL-SCNC: 4 MMOL/L (ref 3.5–5.1)
PROT SERPL-MCNC: 7 G/DL (ref 5.7–8.2)
RBC # BLD AUTO: 4.41 X10(6)UL
SODIUM SERPL-SCNC: 142 MMOL/L (ref 136–145)
T4 FREE SERPL-MCNC: 1.2 NG/DL (ref 0.8–1.7)
TRIGL SERPL-MCNC: 122 MG/DL (ref 30–149)
TSI SER-ACNC: 8.48 UIU/ML (ref 0.55–4.78)
VIT D+METAB SERPL-MCNC: 21.2 NG/ML (ref 30–100)
VLDLC SERPL CALC-MCNC: 19 MG/DL (ref 0–30)
WBC # BLD AUTO: 7.1 X10(3) UL (ref 4–11)

## 2025-01-28 PROCEDURE — 80050 GENERAL HEALTH PANEL: CPT | Performed by: FAMILY MEDICINE

## 2025-01-28 PROCEDURE — 82306 VITAMIN D 25 HYDROXY: CPT | Performed by: FAMILY MEDICINE

## 2025-01-28 PROCEDURE — 80061 LIPID PANEL: CPT | Performed by: FAMILY MEDICINE

## 2025-01-28 PROCEDURE — 84439 ASSAY OF FREE THYROXINE: CPT | Performed by: FAMILY MEDICINE

## 2025-01-29 ENCOUNTER — OFFICE VISIT (OUTPATIENT)
Dept: FAMILY MEDICINE CLINIC | Facility: CLINIC | Age: 65
End: 2025-01-29
Payer: COMMERCIAL

## 2025-01-29 VITALS
BODY MASS INDEX: 29.53 KG/M2 | OXYGEN SATURATION: 98 % | SYSTOLIC BLOOD PRESSURE: 110 MMHG | DIASTOLIC BLOOD PRESSURE: 62 MMHG | HEART RATE: 78 BPM | WEIGHT: 173 LBS | RESPIRATION RATE: 16 BRPM | HEIGHT: 64 IN

## 2025-01-29 DIAGNOSIS — E03.8 SUBCLINICAL HYPOTHYROIDISM: Primary | ICD-10-CM

## 2025-01-29 DIAGNOSIS — R60.0 BILATERAL LOWER EXTREMITY EDEMA: ICD-10-CM

## 2025-01-29 PROCEDURE — 3008F BODY MASS INDEX DOCD: CPT | Performed by: FAMILY MEDICINE

## 2025-01-29 PROCEDURE — 3074F SYST BP LT 130 MM HG: CPT | Performed by: FAMILY MEDICINE

## 2025-01-29 PROCEDURE — 99213 OFFICE O/P EST LOW 20 MIN: CPT | Performed by: FAMILY MEDICINE

## 2025-01-29 PROCEDURE — 3078F DIAST BP <80 MM HG: CPT | Performed by: FAMILY MEDICINE

## 2025-01-29 NOTE — PROGRESS NOTES
[facility] Progress Note    SUBJECTIVE: Herlinda Hernandez 64 year old female is here today for   Chief Complaint   Patient presents with    Edema     Started 8/2024 -now spreading to her legs as well -bilateral -feels pressure in  her calves     Test Results     Discuss lab results        Is having swelling in her lower extremities. Has been going on since August.    Just had labs drawn.     PMH  Past Medical History:    Cervicitis and endocervicitis    Hemorrhagic stroke (HCC)    High blood cholesterol    High blood pressure        PSH  Past Surgical History:   Procedure Laterality Date    Other surgical history      cervix cryosurgery    Tubal ligation          Social Hx:  No changes    ROS  See HPI    OBJECTIVE:  /62   Pulse 78   Resp 16   Ht 5' 4\" (1.626 m)   Wt 173 lb (78.5 kg)   SpO2 98%   BMI 29.70 kg/m²     Exam  Ext: pitting edema bilaterally to mid shin      Labs:          Meds:   Current Outpatient Medications   Medication Sig Dispense Refill    amLODIPine 10 MG Oral Tab Take 1 tablet (10 mg total) by mouth daily. 90 tablet 1    atorvastatin 40 MG Oral Tab Take 1 tablet (40 mg total) by mouth nightly. 90 tablet 1    triamcinolone 0.1 % External Cream Apply topically 2 (two) times daily as needed. 60 g 3         Assessment/Plan  Herlinda was seen today for edema and test results.    Diagnoses and all orders for this visit:    Subclinical hypothyroidism  -     Thyroid peroxidase & thyroglobulin ab [E]; Future  -     TSH and Free T4 [E]; Future    Bilateral lower extremity edema         I suspect this is due to amlodipine based on timeline,will trial reducing does to 5 mg and seeing if improves while BP remains controlled, if not, then will need to consider new antihypertensive plan.     Continue to follow thyroid due to elevated TSH and will check antibodies.    Total Time spent with patient and coordinating care:  15 minutes.    Follow up: as needed      Jose De Leon MD     15

## 2025-02-04 ENCOUNTER — PATIENT MESSAGE (OUTPATIENT)
Dept: FAMILY MEDICINE CLINIC | Facility: CLINIC | Age: 65
End: 2025-02-04

## 2025-02-05 RX ORDER — AMLODIPINE BESYLATE 5 MG/1
5 TABLET ORAL DAILY
Qty: 30 TABLET | Refills: 0 | Status: SHIPPED | OUTPATIENT
Start: 2025-02-05

## 2025-02-17 RX ORDER — ATORVASTATIN CALCIUM 40 MG/1
40 TABLET, FILM COATED ORAL NIGHTLY
Qty: 90 TABLET | Refills: 1 | Status: SHIPPED | OUTPATIENT
Start: 2025-02-17

## 2025-02-24 ENCOUNTER — APPOINTMENT (OUTPATIENT)
Dept: GENERAL RADIOLOGY | Facility: HOSPITAL | Age: 65
End: 2025-02-24
Attending: EMERGENCY MEDICINE
Payer: COMMERCIAL

## 2025-02-24 ENCOUNTER — TELEPHONE (OUTPATIENT)
Dept: FAMILY MEDICINE CLINIC | Facility: CLINIC | Age: 65
End: 2025-02-24

## 2025-02-24 ENCOUNTER — APPOINTMENT (OUTPATIENT)
Dept: CT IMAGING | Facility: HOSPITAL | Age: 65
End: 2025-02-24
Attending: EMERGENCY MEDICINE
Payer: COMMERCIAL

## 2025-02-24 ENCOUNTER — HOSPITAL ENCOUNTER (EMERGENCY)
Facility: HOSPITAL | Age: 65
Discharge: HOME | End: 2025-02-24
Attending: EMERGENCY MEDICINE
Payer: COMMERCIAL

## 2025-02-24 VITALS
HEART RATE: 61 BPM | DIASTOLIC BLOOD PRESSURE: 98 MMHG | OXYGEN SATURATION: 96 % | SYSTOLIC BLOOD PRESSURE: 127 MMHG | RESPIRATION RATE: 18 BRPM | TEMPERATURE: 98 F

## 2025-02-24 DIAGNOSIS — R42 DIZZINESS: Primary | ICD-10-CM

## 2025-02-24 LAB
ALBUMIN SERPL-MCNC: 4.8 G/DL (ref 3.2–4.8)
ALBUMIN/GLOB SERPL: 1.8 {RATIO} (ref 1–2)
ALP LIVER SERPL-CCNC: 78 U/L
ALT SERPL-CCNC: 42 U/L
ANION GAP SERPL CALC-SCNC: 7 MMOL/L (ref 0–18)
AST SERPL-CCNC: 26 U/L (ref ?–34)
ATRIAL RATE: 74 BPM
BASOPHILS # BLD AUTO: 0.05 X10(3) UL (ref 0–0.2)
BASOPHILS NFR BLD AUTO: 0.7 %
BILIRUB SERPL-MCNC: 0.9 MG/DL (ref 0.2–1.1)
BUN BLD-MCNC: 19 MG/DL (ref 9–23)
CALCIUM BLD-MCNC: 9.2 MG/DL (ref 8.7–10.6)
CHLORIDE SERPL-SCNC: 104 MMOL/L (ref 98–112)
CO2 SERPL-SCNC: 28 MMOL/L (ref 21–32)
CREAT BLD-MCNC: 0.79 MG/DL
EGFRCR SERPLBLD CKD-EPI 2021: 82 ML/MIN/1.73M2 (ref 60–?)
EOSINOPHIL # BLD AUTO: 0.12 X10(3) UL (ref 0–0.7)
EOSINOPHIL NFR BLD AUTO: 1.7 %
ERYTHROCYTE [DISTWIDTH] IN BLOOD BY AUTOMATED COUNT: 13 %
GLOBULIN PLAS-MCNC: 2.7 G/DL (ref 2–3.5)
GLUCOSE BLD-MCNC: 77 MG/DL (ref 70–99)
HCT VFR BLD AUTO: 40.4 %
HGB BLD-MCNC: 13.9 G/DL
IMM GRANULOCYTES # BLD AUTO: 0.01 X10(3) UL (ref 0–1)
IMM GRANULOCYTES NFR BLD: 0.1 %
LYMPHOCYTES # BLD AUTO: 2.77 X10(3) UL (ref 1–4)
LYMPHOCYTES NFR BLD AUTO: 38.9 %
MCH RBC QN AUTO: 30.8 PG (ref 26–34)
MCHC RBC AUTO-ENTMCNC: 34.4 G/DL (ref 31–37)
MCV RBC AUTO: 89.4 FL
MONOCYTES # BLD AUTO: 0.61 X10(3) UL (ref 0.1–1)
MONOCYTES NFR BLD AUTO: 8.6 %
NEUTROPHILS # BLD AUTO: 3.56 X10 (3) UL (ref 1.5–7.7)
NEUTROPHILS # BLD AUTO: 3.56 X10(3) UL (ref 1.5–7.7)
NEUTROPHILS NFR BLD AUTO: 50 %
OSMOLALITY SERPL CALC.SUM OF ELEC: 289 MOSM/KG (ref 275–295)
P AXIS: 38 DEGREES
P-R INTERVAL: 150 MS
PLATELET # BLD AUTO: 305 10(3)UL (ref 150–450)
POTASSIUM SERPL-SCNC: 4 MMOL/L (ref 3.5–5.1)
PROT SERPL-MCNC: 7.5 G/DL (ref 5.7–8.2)
Q-T INTERVAL: 392 MS
QRS DURATION: 78 MS
QTC CALCULATION (BEZET): 435 MS
R AXIS: -6 DEGREES
RBC # BLD AUTO: 4.52 X10(6)UL
SODIUM SERPL-SCNC: 139 MMOL/L (ref 136–145)
T AXIS: 12 DEGREES
TROPONIN I SERPL HS-MCNC: <3 NG/L
VENTRICULAR RATE: 74 BPM
WBC # BLD AUTO: 7.1 X10(3) UL (ref 4–11)

## 2025-02-24 PROCEDURE — 36415 COLL VENOUS BLD VENIPUNCTURE: CPT

## 2025-02-24 PROCEDURE — 71045 X-RAY EXAM CHEST 1 VIEW: CPT | Performed by: EMERGENCY MEDICINE

## 2025-02-24 PROCEDURE — 80053 COMPREHEN METABOLIC PANEL: CPT

## 2025-02-24 PROCEDURE — 93005 ELECTROCARDIOGRAM TRACING: CPT

## 2025-02-24 PROCEDURE — 99285 EMERGENCY DEPT VISIT HI MDM: CPT

## 2025-02-24 PROCEDURE — 85025 COMPLETE CBC W/AUTO DIFF WBC: CPT | Performed by: EMERGENCY MEDICINE

## 2025-02-24 PROCEDURE — 99284 EMERGENCY DEPT VISIT MOD MDM: CPT

## 2025-02-24 PROCEDURE — 93010 ELECTROCARDIOGRAM REPORT: CPT

## 2025-02-24 PROCEDURE — 70450 CT HEAD/BRAIN W/O DYE: CPT | Performed by: EMERGENCY MEDICINE

## 2025-02-24 PROCEDURE — 80053 COMPREHEN METABOLIC PANEL: CPT | Performed by: EMERGENCY MEDICINE

## 2025-02-24 PROCEDURE — 85025 COMPLETE CBC W/AUTO DIFF WBC: CPT

## 2025-02-24 PROCEDURE — 84484 ASSAY OF TROPONIN QUANT: CPT | Performed by: EMERGENCY MEDICINE

## 2025-02-24 NOTE — TELEPHONE ENCOUNTER
Pt calling and states she was very dizzy Saturday night.  Pt has a history of stroke and is feeling anxious    Please advise

## 2025-02-24 NOTE — ED INITIAL ASSESSMENT (HPI)
Patient arrives with complaint of dizziness that started Saturday night. Patient states she was sitting when the feeling started and it lasted for about 30 seconds. Patient had a stroke in July 2024. NIH negative at this time in assessment. Strength equal bilaterally.

## 2025-02-24 NOTE — TELEPHONE ENCOUNTER
Saturday late night sitting on couch, started feeling dizzy lasting 30 seconds.  Hx of stroke, and very anxious  Sunday \"took it easy\"    126/84 b/p readings at home, swelling in legs.   Amlodipine 10mg cut to 5mg (took extra dose on Saturday)    Advised pt to go to IC for evaluation. Pt agrees and will proceed.

## 2025-02-25 NOTE — ED PROVIDER NOTES
Patient Seen in: [facility] Emergency Department      History     Chief Complaint   Patient presents with    Dizziness     Stated Complaint: dizziness hx of hemmoragic stroke in 7/2024 started on sat ( over 24 hrs    Subjective:   HPI      64-year-old female comes to the hospital stating on Saturday she had an episode of about 30 seconds where she felt dizzy/lightheaded.  Her blood pressure was a bit high with systolic of 165 at that time but came down with some observation at that time.  Her concern was she did have a history of having hemorrhagic stroke in July 2024 which felt similar.  She has had no dizziness, balance issues or lightheadedness nor headaches, numbness or weakness since that time.  She spoke with her physician's office and told her to come to emergency room further evaluation.  She was recently changed from 10 to 5 mg of amlodipine secondary to some leg swelling.  She is denying any other specific complaints at this time.    Objective:     Past Medical History:    Cervicitis and endocervicitis    Hemorrhagic stroke (HCC)    High blood cholesterol    High blood pressure              Past Surgical History:   Procedure Laterality Date    Other surgical history      cervix cryosurgery    Tubal ligation                  Social History     Socioeconomic History    Marital status:    Tobacco Use    Smoking status: Never    Smokeless tobacco: Never   Vaping Use    Vaping status: Never Used   Substance and Sexual Activity    Alcohol use: Not Currently     Comment: 2 times month    Drug use: No   Other Topics Concern    Caffeine Concern No    Exercise No   Social History Narrative    ** Merged History Encounter **          Social Drivers of Health     Food Insecurity: No Food Insecurity (7/19/2024)    Food Insecurity     Food Insecurity: Never true   Transportation Needs: No Transportation Needs (7/19/2024)    Transportation Needs     Lack of Transportation: No   Housing Stability: Low Risk   (7/19/2024)    Housing Stability     Housing Instability: No                  Physical Exam     ED Triage Vitals [02/24/25 1431]   BP (!) 156/96   Pulse 83   Resp 18   Temp 97.6 °F (36.4 °C)   Temp src Oral   SpO2 96 %   O2 Device None (Room air)       Current Vitals:   Vital Signs  BP: (!) 127/98  Pulse: 61  Resp: 18  Temp: 97.6 °F (36.4 °C)  Temp src: Oral  MAP (mmHg): (!) 108    Oxygen Therapy  SpO2: 96 %  O2 Device: None (Room air)        Physical Exam  HEENT : NCAT, EOMI, PEERL,  neck supple, no JVD, trachea midline, No LAD  Heart: S1S2 normal. No murmurs, regular rate and rhythm  Lungs: Clear to auscultation bilaterally  Abdomen: Soft nontender nondistended normal active bowel sounds without rebound, guarding or masses noted  Back nontender without CVA tenderness  Extremity no clubbing, cyanosis or edema noted.  Full range of motion noted without tenderness  Neuro: No focal deficits noted    All measures to prevent infection transmission during my interaction with the patient were taken.  The patient was already wearing droplet mask on my arrival to the room.  Personal protective equipment including a droplet mask as well as gloves were worn throughout the duration of my exam.  Hand washing was performed prior to and after the exam.  Stethoscope and equipment used during my examination was cleaned with a super Sani cloth germicidal wipe following the exam.    ED Course     Labs Reviewed   COMP METABOLIC PANEL (14) - Normal   TROPONIN I HIGH SENSITIVITY - Normal   CBC WITH DIFFERENTIAL WITH PLATELET   RAINBOW DRAW BLUE     EKG    Rate, intervals and axes as noted on EKG Report.  Rate: 74  Rhythm: Sinus Rhythm  Reading: , QRS of 70, patient normal sinus without acute ischemic change.           ED Course as of 02/24/25 2133  ------------------------------------------------------------  Time: 02/24 2131  Comment: The patient's troponin was negative.  The CBC was normal.  The CMP was unremarkable.  The  patient had a chest x-ray that I personally interpreted showed no acute cardiopulmonary process.  I reviewed the radiology report as well.  The patient is CT brain with no acute changes.  The patient did arrive here a bit hypertensive but while here and observed was markedly improved.       CT BRAIN OR HEAD (CPT=70450)    Result Date: 2/24/2025  PROCEDURE:  CT BRAIN OR HEAD (48924)  COMPARISON:  [facility] , CT, CTA BRAIN + CTA CAROTIDS (CPT=70496/91592), 7/19/2024, 8:59 PM.  INDICATIONS:  dizziness hx of hemmoragic stroke in 7/2024 started on sat ( over 24 hrs  TECHNIQUE:  Noncontrast CT scanning is performed through the brain. Dose reduction techniques were used. Dose information is transmitted to the ACR (American College of Radiology) NRDR (National Radiology Data Registry) which includes the Dose Index Registry.  PATIENT STATED HISTORY: (As transcribed by Technologist)  Dizziness. Diziness started this past Saturday.   History of hemmoraghic stroke in July 2024.     FINDINGS:   VENTRICLES/SULCI: Diffuse sulcal and ventricular prominence concordant with age.  No hydrocephalus. INTRACRANIAL:  Negative for intracranial hemorrhage, mass effect, or acute large vessel transcortical infarct.  Remote infarct left caudate head.  Patchy periventricular white matter hypodensity most in keeping with chronic microvascular ischemia.  Intracranial atherosclerosis. SINUSES:           Paranasal sinuses and mastoid air cells are clear. SKULL:               No evidence for fracture or osseous abnormality. OTHER:               No scalp hematoma.            CONCLUSION:   1. Negative for acute intracranial process.    LOCATION:  [facility]   Dictated by (CST): Jeannette Linder MD on 2/24/2025 at 8:42 PM     Finalized by (CST): Jeannette Linder MD on 2/24/2025 at 8:44 PM       XR CHEST AP PORTABLE  (CPT=71045)    Result Date: 2/24/2025  PROCEDURE:  XR CHEST AP PORTABLE  (CPT=71045)  TECHNIQUE:  AP chest radiograph was obtained.  COMPARISON:   [facility], XR, XR CHEST PA + LAT CHEST (CPT=71046), 3/30/2021, 3:44 PM.  INDICATIONS:  dizziness hx of hemmoragic stroke in 7/2024 started on sat ( over 24 hrs  PATIENT STATED HISTORY: (As transcribed by Technologist)  Patient stated feeling dizzy today.    FINDINGS:             CONCLUSION:  Stable cardiac and mediastinal contours.  Stable vascular prominence of the right hilum.  No pulmonary edema or focal airspace consolidation.  The pleural spaces are clear.   LOCATION:  [facility]      Dictated by (CST): Jeannette Lnider MD on 2/24/2025 at 8:04 PM     Finalized by (CST): Jeannette Linder MD on 2/24/2025 at 8:04 PM        Medications - No data to display         MDM      Differential diagnosis included intracranial pathology, anemia, hypoglycemia but not limited to such.  The patient's initial workup here is normal.  She has not had an episode of this for few days which was very short lasting.  At this time the patient will be discharged home for follow-up.  Patient was screened and evaluated during this visit.   As a treating physician attending to the patient, I determined, within reasonable clinical confidence and prior to discharge, that an emergency medical condition was not or was no longer present.  There was no indication for further evaluation, treatment or admission on an emergency basis.       The usual and customary discharge instuctions were discussed given the patient's ER course.  We discussed signs and symptoms that should prompt the patient's immediate return to the emergency department.   Reasonable over the counter and prescription treatment options and Physician follow up plan was discussed.       The patient is discharged in good condition.     This note was prepared using Dragon Medical voice recognition dictation software.  As a result errors may occur.  When identified to these areas have been corrected.  While every attempt is made to correct errors during dictation discrepancies may still exist.   Please contact if there are any errors.          Medical Decision Making      Disposition and Plan     Clinical Impression:  1. Dizziness         Disposition:  Discharge  2/24/2025  9:32 pm    Follow-up:  Jose De Leno MD  [address] [place] [id number] [phone]    Schedule an appointment as soon as possible for a visit            Medications Prescribed:  Current Discharge Medication List              Supplementary Documentation:

## 2025-03-07 RX ORDER — AMLODIPINE BESYLATE 10 MG/1
10 TABLET ORAL DAILY
Qty: 90 TABLET | Refills: 1 | Status: SHIPPED | OUTPATIENT
Start: 2025-03-07

## 2025-03-21 ENCOUNTER — OFFICE VISIT (OUTPATIENT)
Dept: FAMILY MEDICINE CLINIC | Facility: CLINIC | Age: 65
End: 2025-03-21
Payer: COMMERCIAL

## 2025-03-21 VITALS
OXYGEN SATURATION: 98 % | WEIGHT: 175 LBS | HEART RATE: 88 BPM | RESPIRATION RATE: 16 BRPM | DIASTOLIC BLOOD PRESSURE: 80 MMHG | BODY MASS INDEX: 29.88 KG/M2 | SYSTOLIC BLOOD PRESSURE: 126 MMHG | HEIGHT: 64 IN

## 2025-03-21 DIAGNOSIS — H81.10 BENIGN PAROXYSMAL POSITIONAL VERTIGO, UNSPECIFIED LATERALITY: ICD-10-CM

## 2025-03-21 DIAGNOSIS — E66.09 CLASS 1 OBESITY DUE TO EXCESS CALORIES WITH SERIOUS COMORBIDITY AND BODY MASS INDEX (BMI) OF 30.0 TO 30.9 IN ADULT: Primary | ICD-10-CM

## 2025-03-21 DIAGNOSIS — E66.811 CLASS 1 OBESITY DUE TO EXCESS CALORIES WITH SERIOUS COMORBIDITY AND BODY MASS INDEX (BMI) OF 30.0 TO 30.9 IN ADULT: Primary | ICD-10-CM

## 2025-03-21 PROCEDURE — 99213 OFFICE O/P EST LOW 20 MIN: CPT | Performed by: FAMILY MEDICINE

## 2025-03-21 PROCEDURE — 3074F SYST BP LT 130 MM HG: CPT | Performed by: FAMILY MEDICINE

## 2025-03-21 PROCEDURE — 3008F BODY MASS INDEX DOCD: CPT | Performed by: FAMILY MEDICINE

## 2025-03-21 PROCEDURE — 3079F DIAST BP 80-89 MM HG: CPT | Performed by: FAMILY MEDICINE

## 2025-03-21 NOTE — PROGRESS NOTES
[facility] Progress Note    SUBJECTIVE: Herlinda Hernandez 64 year old female is here today for   Chief Complaint   Patient presents with    Dizziness     Was seen in ER on 2/24/25. She had been having waves of dizziness. She went to the ER because that was a symptom she had before her stroke. Currently she feels lightheaded, usually when she turns her head quickly    Hypertension     Pt was taking 5 mg of amlodipine. She monitors her blood pressure with an at home BP monitor. She was having higher readings. She then switched to 10 mg of amlodipine daily in the am        About a month ago, had dizziness, was with her daughter, was sitting still, doesn't remember even moving but felt dizzy, and worried about stroke, delayed but went the next day to ER. BP was high at the time which helped trigger desired to go.    The blast of dizziness lasted 30 seconds or so, and idn't come back.    Now does occasionally feel slightly lightheaded, if she moves to fast, or bends over.    We had backed off amlodipine back to 10 mg due to the above.          PMH  Past Medical History:    Cervicitis and endocervicitis    Hemorrhagic stroke (HCC)    High blood cholesterol    High blood pressure        PSH  Past Surgical History:   Procedure Laterality Date    Other surgical history      cervix cryosurgery    Tubal ligation          Social Hx:  No major changes    ROS  See HPI    OBJECTIVE:  /80   Pulse 88   Resp 16   Ht 5' 4\" (1.626 m)   Wt 175 lb (79.4 kg)   SpO2 98%   BMI 30.04 kg/m²     Exam  Neuro: CN II-XII grossly intact, normal finger to nose, normal rapid alternating movement normal gait, romberg normal        Labs:          Meds:   Current Outpatient Medications   Medication Sig Dispense Refill    amLODIPine 10 MG Oral Tab Take 1 tablet (10 mg total) by mouth daily. 90 tablet 1    atorvastatin 40 MG Oral Tab Take 1 tablet (40 mg total) by mouth nightly. 90 tablet 1    amLODIPine 5 MG Oral Tab Take 1  tablet (5 mg total) by mouth daily. (Patient not taking: Reported on 3/21/2025) 30 tablet 0         Assessment/Plan  Herlinda was seen today for dizziness and hypertension.    Diagnoses and all orders for this visit:    Class 1 obesity due to excess calories with serious comorbidity and body mass index (BMI) of 30.0 to 30.9 in adult  -     Refer to [facility]    Benign paroxysmal positional vertigo, unspecified laterality         Will watch for now as symptoms resolved.    She would like to see weight loss specialist for non medication options     Has swelling mild from amlodipin, watch for now, but could consider changing.    Total Time spent with patient and coordinating care:  20 minutes.    Follow up: as needed or for wellness visit.      Jose De Leon MD

## 2025-06-11 ENCOUNTER — OFFICE VISIT (OUTPATIENT)
Dept: FAMILY MEDICINE CLINIC | Facility: CLINIC | Age: 65
End: 2025-06-11
Payer: COMMERCIAL

## 2025-06-11 VITALS
WEIGHT: 180 LBS | OXYGEN SATURATION: 98 % | HEART RATE: 77 BPM | SYSTOLIC BLOOD PRESSURE: 112 MMHG | DIASTOLIC BLOOD PRESSURE: 72 MMHG | BODY MASS INDEX: 30.73 KG/M2 | RESPIRATION RATE: 16 BRPM | HEIGHT: 64 IN

## 2025-06-11 DIAGNOSIS — I10 ESSENTIAL HYPERTENSION: Primary | ICD-10-CM

## 2025-06-11 PROCEDURE — 99214 OFFICE O/P EST MOD 30 MIN: CPT | Performed by: FAMILY MEDICINE

## 2025-06-11 PROCEDURE — 3074F SYST BP LT 130 MM HG: CPT | Performed by: FAMILY MEDICINE

## 2025-06-11 PROCEDURE — 3008F BODY MASS INDEX DOCD: CPT | Performed by: FAMILY MEDICINE

## 2025-06-11 PROCEDURE — 3078F DIAST BP <80 MM HG: CPT | Performed by: FAMILY MEDICINE

## 2025-06-11 RX ORDER — LOSARTAN POTASSIUM 25 MG/1
25 TABLET ORAL DAILY
Qty: 30 TABLET | Refills: 1 | Status: SHIPPED | OUTPATIENT
Start: 2025-06-11

## 2025-06-11 NOTE — PROGRESS NOTES
[facility] Progress Note    SUBJECTIVE: Herlinda Hernandez 64 year old female is here today for   Chief Complaint   Patient presents with    Blood Pressure     Wants to discuss medications-edema of both legs and ankles -feels like she is bloated and swollen and her weight is increasing        Feesl fin with medication, but dealing with swelling in her legs and feet.    We had increased to    PMH  Past Medical History[1]     PSH  Past Surgical History[2]     Social Hx:  No changes    ROS  See HPI    OBJECTIVE:  /72   Pulse 77   Resp 16   Ht 5' 4\" (1.626 m)   Wt 180 lb (81.6 kg)   SpO2 98%   BMI 30.90 kg/m²     Exam  Extremities: edema on bilateral legs      Labs:          Meds:   Current Medications[3]      Assessment/Plan  Herlinda was seen today for blood pressure.    Diagnoses and all orders for this visit:    Essential hypertension  -     losartan 25 MG Oral Tab; Take 1 tablet (25 mg total) by mouth daily.  -     Basic Metabolic Panel (8) [E]; Future         Will de souza the antihypertensive plan to losartan and follow up BP and labs         Total Time spent with patient and coordinating care:  25 minutes.    Follow up: as needed or for wellness      Jose De Leon MD         [1]   Past Medical History:   Cervicitis and endocervicitis    Hemorrhagic stroke (HCC)    High blood cholesterol    High blood pressure   [2]   Past Surgical History:  Procedure Laterality Date    Other surgical history      cervix cryosurgery    Tubal ligation     [3]   Current Outpatient Medications   Medication Sig Dispense Refill    losartan 25 MG Oral Tab Take 1 tablet (25 mg total) by mouth daily. 30 tablet 1    atorvastatin 40 MG Oral Tab Take 1 tablet (40 mg total) by mouth nightly. 90 tablet 1

## 2025-08-01 ENCOUNTER — LAB ENCOUNTER (OUTPATIENT)
Dept: LAB | Age: 65
End: 2025-08-01
Attending: FAMILY MEDICINE

## 2025-08-01 DIAGNOSIS — E03.8 SUBCLINICAL HYPOTHYROIDISM: ICD-10-CM

## 2025-08-01 DIAGNOSIS — I10 ESSENTIAL HYPERTENSION: ICD-10-CM

## 2025-08-01 LAB
ANION GAP SERPL CALC-SCNC: 8 MMOL/L (ref 0–18)
BUN BLD-MCNC: 15 MG/DL (ref 9–23)
CALCIUM BLD-MCNC: 9.4 MG/DL (ref 8.7–10.6)
CHLORIDE SERPL-SCNC: 105 MMOL/L (ref 98–112)
CO2 SERPL-SCNC: 28 MMOL/L (ref 21–32)
CREAT BLD-MCNC: 0.77 MG/DL (ref 0.55–1.02)
EGFRCR SERPLBLD CKD-EPI 2021: 85 ML/MIN/1.73M2 (ref 60–?)
FASTING STATUS PATIENT QL REPORTED: YES
GLUCOSE BLD-MCNC: 93 MG/DL (ref 70–99)
OSMOLALITY SERPL CALC.SUM OF ELEC: 293 MOSM/KG (ref 275–295)
POTASSIUM SERPL-SCNC: 3.9 MMOL/L (ref 3.5–5.1)
SODIUM SERPL-SCNC: 141 MMOL/L (ref 136–145)
T4 FREE SERPL-MCNC: 1.2 NG/DL (ref 0.8–1.7)
THYROGLOB SERPL-MCNC: 60 U/ML (ref ?–60)
THYROPEROXIDASE AB SERPL-ACNC: 1179 U/ML (ref ?–60)
TSI SER-ACNC: 5.19 UIU/ML (ref 0.55–4.78)

## 2025-08-01 PROCEDURE — 84443 ASSAY THYROID STIM HORMONE: CPT | Performed by: FAMILY MEDICINE

## 2025-08-01 PROCEDURE — 86376 MICROSOMAL ANTIBODY EACH: CPT | Performed by: FAMILY MEDICINE

## 2025-08-01 PROCEDURE — 84439 ASSAY OF FREE THYROXINE: CPT | Performed by: FAMILY MEDICINE

## 2025-08-01 PROCEDURE — 86800 THYROGLOBULIN ANTIBODY: CPT | Performed by: FAMILY MEDICINE

## 2025-08-01 PROCEDURE — 80048 BASIC METABOLIC PNL TOTAL CA: CPT | Performed by: FAMILY MEDICINE

## 2025-08-04 DIAGNOSIS — I10 ESSENTIAL HYPERTENSION: ICD-10-CM

## 2025-08-04 RX ORDER — ATORVASTATIN CALCIUM 40 MG/1
40 TABLET, FILM COATED ORAL NIGHTLY
Qty: 90 TABLET | Refills: 1 | Status: SHIPPED | OUTPATIENT
Start: 2025-08-04

## 2025-08-04 RX ORDER — LOSARTAN POTASSIUM 25 MG/1
25 TABLET ORAL DAILY
Qty: 30 TABLET | Refills: 1 | Status: SHIPPED | OUTPATIENT
Start: 2025-08-04

## (undated) NOTE — ED AVS SNAPSHOT
Miller Rode   MRN: [id number]    Department:  [facility] Emergency Department   Date of Visit:  12/2/2017           Disclosure     Insurance plans vary and the physician(s) referred by the ER may not be covered by your plan.  Please cont tell this physician (or your personal doctor if your instructions are to return to your personal doctor) about any new or lasting problems. The primary care or specialist physician will see patients referred from the [facility] Emergency Department.  Jed Velazquez

## (undated) NOTE — LETTER
Date & Time: 3/27/2021, 11:40 AM  Patient: Dulce Hernandez  Encounter Provider(s):    HEIDI Carver       To Whom It May Concern:    Nessa Pinont KATIANAPRADEEP FONG NARCISO was seen and treated in our department on 3/27/2021.  She should not return to work [employer]

## (undated) NOTE — LETTER
December 2, 2017    Patient: Roslyn Casanova [facility]   Date of Visit: 12/2/2017       To Whom It May Concern:    Mendez Quijano was seen and treated in our emergency department on 12/2/2017. She should not return to work until [employer].     If

## (undated) NOTE — LETTER
11/05/18        [address]  [address] [facility] [address]      Dear Heriberto Andrews,    [id number] [facility] records indicate that you have outstanding lab work and or testing that was ordered for you and has not yet been completed:  Orders Placed This E

## (undated) NOTE — LETTER
Date: 2/17/2020    Patient Name: Margaret Hernandez          To Whom it may concern: This letter has been written at the patient's request. The above patient was seen at the [facility] for treatment of a medical condition.     This jack

## (undated) NOTE — LETTER
11/02/18        [place] [address]      Dear Huong Raman,    [address] [facility] records indicate that you have outstanding lab work and or testing that was ordered for you and has not yet been completed:  Orders Placed This E